# Patient Record
Sex: MALE | Race: WHITE | NOT HISPANIC OR LATINO | Employment: FULL TIME | URBAN - METROPOLITAN AREA
[De-identification: names, ages, dates, MRNs, and addresses within clinical notes are randomized per-mention and may not be internally consistent; named-entity substitution may affect disease eponyms.]

---

## 2023-03-02 LAB — PROSTATE SPECIFIC AG (NG/ML) IN SER/PLAS: 3.92 NG/ML (ref 0–4)

## 2023-12-13 ENCOUNTER — TELEPHONE (OUTPATIENT)
Dept: PRIMARY CARE | Facility: CLINIC | Age: 68
End: 2023-12-13
Payer: COMMERCIAL

## 2023-12-13 DIAGNOSIS — R35.0 INCREASED URINARY FREQUENCY: ICD-10-CM

## 2023-12-13 DIAGNOSIS — R35.1 NOCTURIA: ICD-10-CM

## 2023-12-13 RX ORDER — TAMSULOSIN HYDROCHLORIDE 0.4 MG/1
0.4 CAPSULE ORAL DAILY
COMMUNITY
End: 2023-12-13 | Stop reason: SDUPTHER

## 2023-12-13 NOTE — TELEPHONE ENCOUNTER
ALPHA SPLIT    Tamsulosin 0.4 mg    [facility] [address]    Last office visit: 1.30.23 Dr. Burris    Scheduled with Dr. Thompson 1.04.24

## 2023-12-15 RX ORDER — TAMSULOSIN HYDROCHLORIDE 0.4 MG/1
0.4 CAPSULE ORAL DAILY
Qty: 30 CAPSULE | Refills: 0 | Status: SHIPPED | OUTPATIENT
Start: 2023-12-15 | End: 2024-01-04 | Stop reason: SDUPTHER

## 2024-01-04 ENCOUNTER — LAB (OUTPATIENT)
Dept: LAB | Facility: LAB | Age: 69
End: 2024-01-04
Payer: COMMERCIAL

## 2024-01-04 ENCOUNTER — OFFICE VISIT (OUTPATIENT)
Dept: PRIMARY CARE | Facility: CLINIC | Age: 69
End: 2024-01-04
Payer: COMMERCIAL

## 2024-01-04 VITALS
BODY MASS INDEX: 29.96 KG/M2 | WEIGHT: 214 LBS | HEART RATE: 49 BPM | OXYGEN SATURATION: 98 % | TEMPERATURE: 97.3 F | DIASTOLIC BLOOD PRESSURE: 84 MMHG | SYSTOLIC BLOOD PRESSURE: 140 MMHG | HEIGHT: 71 IN

## 2024-01-04 DIAGNOSIS — R03.0 ELEVATED BLOOD PRESSURE READING: ICD-10-CM

## 2024-01-04 DIAGNOSIS — R35.1 BENIGN PROSTATIC HYPERPLASIA WITH NOCTURIA: ICD-10-CM

## 2024-01-04 DIAGNOSIS — R35.0 INCREASED URINARY FREQUENCY: ICD-10-CM

## 2024-01-04 DIAGNOSIS — N40.1 BENIGN PROSTATIC HYPERPLASIA WITH NOCTURIA: ICD-10-CM

## 2024-01-04 DIAGNOSIS — Z00.00 WELLNESS EXAMINATION: Primary | ICD-10-CM

## 2024-01-04 DIAGNOSIS — R35.1 NOCTURIA: ICD-10-CM

## 2024-01-04 DIAGNOSIS — Z12.11 SCREEN FOR COLON CANCER: ICD-10-CM

## 2024-01-04 DIAGNOSIS — E78.1 HYPERTRIGLYCERIDEMIA: ICD-10-CM

## 2024-01-04 LAB
ALBUMIN SERPL BCP-MCNC: 4.3 G/DL (ref 3.4–5)
ALP SERPL-CCNC: 69 U/L (ref 33–136)
ALT SERPL W P-5'-P-CCNC: 22 U/L (ref 10–52)
ANION GAP SERPL CALC-SCNC: 11 MMOL/L (ref 10–20)
AST SERPL W P-5'-P-CCNC: 20 U/L (ref 9–39)
BILIRUB SERPL-MCNC: 0.9 MG/DL (ref 0–1.2)
BUN SERPL-MCNC: 20 MG/DL (ref 6–23)
CALCIUM SERPL-MCNC: 9.4 MG/DL (ref 8.6–10.3)
CHLORIDE SERPL-SCNC: 103 MMOL/L (ref 98–107)
CHOLEST SERPL-MCNC: 180 MG/DL (ref 0–199)
CHOLESTEROL/HDL RATIO: 3.9
CO2 SERPL-SCNC: 31 MMOL/L (ref 21–32)
CREAT SERPL-MCNC: 0.98 MG/DL (ref 0.5–1.3)
ERYTHROCYTE [DISTWIDTH] IN BLOOD BY AUTOMATED COUNT: 13.3 % (ref 11.5–14.5)
EST. AVERAGE GLUCOSE BLD GHB EST-MCNC: 114 MG/DL
GFR SERPL CREATININE-BSD FRML MDRD: 85 ML/MIN/1.73M*2
GLUCOSE SERPL-MCNC: 94 MG/DL (ref 74–99)
HBA1C MFR BLD: 5.6 %
HCT VFR BLD AUTO: 48.1 % (ref 41–52)
HDLC SERPL-MCNC: 46.4 MG/DL
HGB BLD-MCNC: 16.4 G/DL (ref 13.5–17.5)
LDLC SERPL CALC-MCNC: 81 MG/DL
MCH RBC QN AUTO: 29.4 PG (ref 26–34)
MCHC RBC AUTO-ENTMCNC: 34.1 G/DL (ref 32–36)
MCV RBC AUTO: 86 FL (ref 80–100)
NON HDL CHOLESTEROL: 134 MG/DL (ref 0–149)
NRBC BLD-RTO: 0 /100 WBCS (ref 0–0)
PLATELET # BLD AUTO: 254 X10*3/UL (ref 150–450)
POTASSIUM SERPL-SCNC: 4.5 MMOL/L (ref 3.5–5.3)
PROT SERPL-MCNC: 6.8 G/DL (ref 6.4–8.2)
RBC # BLD AUTO: 5.57 X10*6/UL (ref 4.5–5.9)
SODIUM SERPL-SCNC: 140 MMOL/L (ref 136–145)
TRIGL SERPL-MCNC: 263 MG/DL (ref 0–149)
VLDL: 53 MG/DL (ref 0–40)
WBC # BLD AUTO: 8.1 X10*3/UL (ref 4.4–11.3)

## 2024-01-04 PROCEDURE — 80053 COMPREHEN METABOLIC PANEL: CPT

## 2024-01-04 PROCEDURE — 36415 COLL VENOUS BLD VENIPUNCTURE: CPT

## 2024-01-04 PROCEDURE — 99397 PER PM REEVAL EST PAT 65+ YR: CPT | Performed by: FAMILY MEDICINE

## 2024-01-04 PROCEDURE — 80061 LIPID PANEL: CPT

## 2024-01-04 PROCEDURE — 85027 COMPLETE CBC AUTOMATED: CPT

## 2024-01-04 PROCEDURE — 82274 ASSAY TEST FOR BLOOD FECAL: CPT

## 2024-01-04 PROCEDURE — 1160F RVW MEDS BY RX/DR IN RCRD: CPT | Performed by: FAMILY MEDICINE

## 2024-01-04 PROCEDURE — 83036 HEMOGLOBIN GLYCOSYLATED A1C: CPT

## 2024-01-04 PROCEDURE — 1159F MED LIST DOCD IN RCRD: CPT | Performed by: FAMILY MEDICINE

## 2024-01-04 PROCEDURE — 1036F TOBACCO NON-USER: CPT | Performed by: FAMILY MEDICINE

## 2024-01-04 RX ORDER — TAMSULOSIN HYDROCHLORIDE 0.4 MG/1
0.4 CAPSULE ORAL DAILY
Qty: 90 CAPSULE | Refills: 1 | Status: SHIPPED | OUTPATIENT
Start: 2024-01-04 | End: 2024-07-02

## 2024-01-04 ASSESSMENT — PATIENT HEALTH QUESTIONNAIRE - PHQ9
1. LITTLE INTEREST OR PLEASURE IN DOING THINGS: NOT AT ALL
SUM OF ALL RESPONSES TO PHQ9 QUESTIONS 1 AND 2: 0
2. FEELING DOWN, DEPRESSED OR HOPELESS: NOT AT ALL

## 2024-01-04 ASSESSMENT — ENCOUNTER SYMPTOMS
LOSS OF SENSATION IN FEET: 0
DEPRESSION: 0
OCCASIONAL FEELINGS OF UNSTEADINESS: 0

## 2024-01-04 NOTE — ASSESSMENT & PLAN NOTE
Had an elevated PSA   - Follows w/ Urology   - Tamsulosin daily   - postural orthostatic hypotension since taking tamsulosin; toelrable at this time.

## 2024-01-04 NOTE — PROGRESS NOTES
"Subjective   Patient ID: Abhijeet Broderick is a 68 y.o. male who presents for Annual Exam.    Daughters 24, 34 ([facility])   Currently remodeling his basement.     Exercise: running/biking, lifting weights   Diet: home cooked meals, no red meat   FMHx: up todated     BPH w/ Nocturia   - follows w/ urology   - dizziness; with standing up quickly; blurry vision due to vision   - PSA in June scheduled     Elevated Triglycerides   - 223 at last blood work (9/23)   -    The 10-year ASCVD risk score (Hank HERNÁNDEZ, et al., 2019) is: 14.4%    Values used to calculate the score:      Age: 68 years      Sex: Male      Is Non- : No      Diabetic: No      Tobacco smoker: No      Systolic Blood Pressure: 140 mmHg      Is BP treated: No      HDL Cholesterol: 51.6 mg/dL      Total Cholesterol: 176 mg/dL     Objective   /84 (BP Location: Left arm, Patient Position: Sitting, BP Cuff Size: Adult)   Pulse (!) 49   Temp 36.3 °C (97.3 °F) (Skin)   Ht 1.803 m (5' 11\")   Wt 97.1 kg (214 lb)   SpO2 98%   BMI 29.85 kg/m²     Physical Exam  Constitutional:       General: He is not in acute distress.     Appearance: Normal appearance. He is not ill-appearing, toxic-appearing or diaphoretic.   HENT:      Head: Normocephalic and atraumatic.   Eyes:      Extraocular Movements: Extraocular movements intact.      Pupils: Pupils are equal, round, and reactive to light.   Cardiovascular:      Rate and Rhythm: Normal rate and regular rhythm.      Pulses: Normal pulses.      Heart sounds: Normal heart sounds.   Pulmonary:      Effort: Pulmonary effort is normal.      Breath sounds: Normal breath sounds.   Neurological:      Mental Status: He is alert.         Assessment/Plan   Problem List Items Addressed This Visit             ICD-10-CM       Cardiac and Vasculature    Hypertriglyceridemia E78.1     -    - ASCVD Risk 14.4%   - not currently on any medication; discussed risks and benefits of statin medication; he " would like to repeat lipid panel and see where values are at this time before initiating medication.             Genitourinary and Reproductive    Benign prostatic hyperplasia with nocturia N40.1, R35.1     Had an elevated PSA   - Follows w/ Urology   - Tamsulosin daily   - postural orthostatic hypotension since taking tamsulosin; toelrable at this time.           Other Visit Diagnoses         Codes    Wellness examination    -  Primary Z00.00    FIT testing annually, reordered.     Elevated blood pressure reading     R03.0    Relevant Orders    Lipid panel    Hemoglobin A1c    Increased urinary frequency     R35.0    Relevant Medications    tamsulosin (Flomax) 0.4 mg 24 hr capsule    Other Relevant Orders    Comprehensive metabolic panel    CBC    Nocturia     R35.1    Relevant Medications    tamsulosin (Flomax) 0.4 mg 24 hr capsule    Other Relevant Orders    Comprehensive metabolic panel    CBC    Screen for colon cancer     Z12.11    Relevant Orders    Fecal Occult Blood Immunoassy          Komal Thompson DO

## 2024-01-04 NOTE — ASSESSMENT & PLAN NOTE
-    - ASCVD Risk 14.4%   - not currently on any medication; discussed risks and benefits of statin medication; he would like to repeat lipid panel and see where values are at this time before initiating medication.

## 2024-01-15 ENCOUNTER — LAB REQUISITION (OUTPATIENT)
Dept: LAB | Facility: HOSPITAL | Age: 69
End: 2024-01-15
Payer: COMMERCIAL

## 2024-01-15 DIAGNOSIS — Z12.11 ENCOUNTER FOR SCREENING FOR MALIGNANT NEOPLASM OF COLON: ICD-10-CM

## 2024-01-18 LAB — HEMOCCULT STL QL IA: NEGATIVE

## 2024-04-10 ENCOUNTER — OFFICE VISIT (OUTPATIENT)
Dept: PRIMARY CARE | Facility: CLINIC | Age: 69
End: 2024-04-10
Payer: COMMERCIAL

## 2024-04-10 VITALS
DIASTOLIC BLOOD PRESSURE: 100 MMHG | OXYGEN SATURATION: 99 % | HEART RATE: 53 BPM | BODY MASS INDEX: 29.79 KG/M2 | SYSTOLIC BLOOD PRESSURE: 170 MMHG | WEIGHT: 213.6 LBS | TEMPERATURE: 97.6 F

## 2024-04-10 DIAGNOSIS — I10 PRIMARY HYPERTENSION: ICD-10-CM
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
## 2024-04-10 DIAGNOSIS — M62.838 MUSCLE SPASM: ICD-10-CM

## 2024-04-10 DIAGNOSIS — M54.50 LOW BACK PAIN, UNSPECIFIED BACK PAIN LATERALITY, UNSPECIFIED CHRONICITY, UNSPECIFIED WHETHER SCIATICA PRESENT: ICD-10-CM

## 2024-04-10 PROCEDURE — 3080F DIAST BP >= 90 MM HG: CPT | Performed by: FAMILY MEDICINE

## 2024-04-10 PROCEDURE — 1160F RVW MEDS BY RX/DR IN RCRD: CPT | Performed by: FAMILY MEDICINE

## 2024-04-10 PROCEDURE — 99213 OFFICE O/P EST LOW 20 MIN: CPT | Performed by: FAMILY MEDICINE

## 2024-04-10 PROCEDURE — 1159F MED LIST DOCD IN RCRD: CPT | Performed by: FAMILY MEDICINE

## 2024-04-10 PROCEDURE — 3077F SYST BP >= 140 MM HG: CPT | Performed by: FAMILY MEDICINE

## 2024-04-10 ASSESSMENT — ENCOUNTER SYMPTOMS
DEPRESSION: 0
LOSS OF SENSATION IN FEET: 0
OCCASIONAL FEELINGS OF UNSTEADINESS: 0

## 2024-04-10 NOTE — PROGRESS NOTES
Subjective   Patient ID: Abhijeet Broderick is a 69 y.o. male who presents for Fall (4 days ago, fell down 16 stairs.  Back and shoulder pain.).     Abhijeet is presenting to the office after a fall down his stairs.  He was looking to fix they are chairlift for stairs when he fell down on the stairs he hit his shoulder and his back and his head on the stairs.  This occurred 4 days ago.  Since that time he has not lost consciousness he has had some headaches and has been taking Tylenol and ibuprofen for the pain he did have a cut on his head but is no longer bleeding.  He states that he was and is mostly sore at this time but ultimately things are getting better day by day small approximately 5 mm        Advil   - every 4-6 hours   - 800mg       Objective   BP (!) 170/100 (BP Location: Left arm, Patient Position: Sitting, BP Cuff Size: Adult)   Pulse 53   Temp 36.4 °C (97.6 °F) (Skin)   Wt 96.9 kg (213 lb 9.6 oz)   SpO2 99%   BMI 29.79 kg/m²     Physical Exam  Musculoskeletal:      Right shoulder: No tenderness.      Left shoulder: Tenderness present. Normal range of motion.      Cervical back: Normal.      Thoracic back: Normal.      Lumbar back: Tenderness and bony tenderness present.   Skin:            Comments: Laceration to the back of the head, Small approximately 5 mm, healing         Assessment/Plan   Diagnoses and all orders for this visit:  Fall, initial encounter  Low back pain, unspecified back pain laterality, unspecified chronicity, unspecified whether sciatica present  Muscle spasm  Primary hypertension  Abhijeet is a 69-year-old male presenting to the office after a fall.  He has a laceration to the back of the head that is well-healing healing and does not need any other intervention at this time.  In regards to his shoulder remote low back pain I do believe this is all musculoskeletal.  We did discuss the pros and cons of getting an x-ray imaging.  However we can forego that at this time.  His  blood pressure was significantly elevated in the office to 170/100 but I suspect that this is in part due to his increased use of ibuprofen over the past few days since the fall.  We discussed that once he comes off the medication we should recheck his blood pressure to determine if treatment is necessary.  We discussed checking his blood pressure at home to ensure that it does come down.      Komal Thompson,      I spent a total of 14 minutes on the date of the service which included preparing to see the patient, face-to-face patient care, completing clinical documentation, performing a medically appropriate examination, counseling and educating the patient/family/caregiver and ordering medications, tests, or procedures.

## 2024-05-20 ENCOUNTER — LAB (OUTPATIENT)
Dept: LAB | Facility: LAB | Age: 69
End: 2024-05-20
Payer: COMMERCIAL

## 2024-05-20 DIAGNOSIS — R97.20 ELEVATED PROSTATE SPECIFIC ANTIGEN (PSA): Primary | ICD-10-CM

## 2024-05-20 LAB — PSA SERPL-MCNC: 3.32 NG/ML

## 2024-05-20 PROCEDURE — 84153 ASSAY OF PSA TOTAL: CPT

## 2024-05-20 PROCEDURE — 36415 COLL VENOUS BLD VENIPUNCTURE: CPT

## 2024-07-03 DIAGNOSIS — R35.0 INCREASED URINARY FREQUENCY: ICD-10-CM

## 2024-07-03 DIAGNOSIS — R35.1 NOCTURIA: ICD-10-CM

## 2024-07-05 RX ORDER — TAMSULOSIN HYDROCHLORIDE 0.4 MG/1
0.4 CAPSULE ORAL DAILY
Qty: 90 CAPSULE | Refills: 0 | Status: SHIPPED | OUTPATIENT
Start: 2024-07-05

## 2024-09-27 ENCOUNTER — TELEPHONE (OUTPATIENT)
Dept: PRIMARY CARE | Facility: CLINIC | Age: 69
End: 2024-09-27
Payer: COMMERCIAL

## 2024-09-27 DIAGNOSIS — R35.1 NOCTURIA: ICD-10-CM

## 2024-09-27 DIAGNOSIS — R35.0 INCREASED URINARY FREQUENCY: ICD-10-CM

## 2024-09-27 NOTE — TELEPHONE ENCOUNTER
Rx Refill Request Telephone Encounter  tamsulosin (Flomax) 0.4 mg 24 hr capsule      Pharmacy:   [facility]

## 2024-09-30 RX ORDER — TAMSULOSIN HYDROCHLORIDE 0.4 MG/1
0.4 CAPSULE ORAL DAILY
Qty: 90 CAPSULE | Refills: 1 | Status: SHIPPED | OUTPATIENT
Start: 2024-09-30

## 2025-01-29 ENCOUNTER — APPOINTMENT (OUTPATIENT)
Dept: CARDIOLOGY | Facility: HOSPITAL | Age: 70
End: 2025-01-29
Payer: COMMERCIAL

## 2025-01-29 ENCOUNTER — HOSPITAL ENCOUNTER (INPATIENT)
Facility: HOSPITAL | Age: 70
LOS: 2 days | Discharge: HOME | End: 2025-01-31
Attending: EMERGENCY MEDICINE | Admitting: INTERNAL MEDICINE
Payer: COMMERCIAL

## 2025-01-29 ENCOUNTER — APPOINTMENT (OUTPATIENT)
Dept: RADIOLOGY | Facility: HOSPITAL | Age: 70
End: 2025-01-29
Payer: COMMERCIAL

## 2025-01-29 ENCOUNTER — APPOINTMENT (OUTPATIENT)
Dept: PRIMARY CARE | Facility: CLINIC | Age: 70
End: 2025-01-29
Payer: COMMERCIAL

## 2025-01-29 VITALS
BODY MASS INDEX: 30.6 KG/M2 | HEIGHT: 71 IN | WEIGHT: 218.6 LBS | DIASTOLIC BLOOD PRESSURE: 90 MMHG | SYSTOLIC BLOOD PRESSURE: 190 MMHG | TEMPERATURE: 97.3 F | OXYGEN SATURATION: 97 % | HEART RATE: 46 BPM

## 2025-01-29 DIAGNOSIS — I49.9 CARDIAC ARRHYTHMIA, UNSPECIFIED CARDIAC ARRHYTHMIA TYPE: ICD-10-CM

## 2025-01-29 DIAGNOSIS — I15.9 SECONDARY HYPERTENSION: ICD-10-CM

## 2025-01-29 DIAGNOSIS — R00.1 BRADYCARDIA: Primary | ICD-10-CM

## 2025-01-29 DIAGNOSIS — I16.1 HYPERTENSIVE EMERGENCY: ICD-10-CM

## 2025-01-29 DIAGNOSIS — R06.02 SOB (SHORTNESS OF BREATH): ICD-10-CM

## 2025-01-29 DIAGNOSIS — R00.1 SYMPTOMATIC BRADYCARDIA: ICD-10-CM

## 2025-01-29 DIAGNOSIS — R00.1 BRADYCARDIA, UNSPECIFIED: ICD-10-CM

## 2025-01-29 DIAGNOSIS — Z95.0 STATUS POST PLACEMENT OF CARDIAC PACEMAKER: ICD-10-CM

## 2025-01-29 DIAGNOSIS — I44.1 HEART BLOCK AV SECOND DEGREE: ICD-10-CM

## 2025-01-29 DIAGNOSIS — I45.9 HEART BLOCK: Primary | ICD-10-CM

## 2025-01-29 LAB
ALBUMIN SERPL BCP-MCNC: 4.4 G/DL (ref 3.4–5)
ALP SERPL-CCNC: 74 U/L (ref 33–136)
ALT SERPL W P-5'-P-CCNC: 16 U/L (ref 10–52)
ANION GAP SERPL CALC-SCNC: 8 MMOL/L (ref 10–20)
AORTIC VALVE MEAN GRADIENT: 5 MMHG
AORTIC VALVE PEAK VELOCITY: 1.67 M/S
APTT PPP: 27 SECONDS (ref 27–38)
AST SERPL W P-5'-P-CCNC: 16 U/L (ref 9–39)
ATRIAL RATE: 28 BPM
AV PEAK GRADIENT: 11 MMHG
AVA (PEAK VEL): 2.32 CM2
AVA (VTI): 2.19 CM2
BASOPHILS # BLD AUTO: 0.08 X10*3/UL (ref 0–0.1)
BASOPHILS NFR BLD AUTO: 1 %
BILIRUB SERPL-MCNC: 1 MG/DL (ref 0–1.2)
BNP SERPL-MCNC: 318 PG/ML (ref 0–99)
BUN SERPL-MCNC: 19 MG/DL (ref 6–23)
CALCIUM SERPL-MCNC: 9.1 MG/DL (ref 8.6–10.3)
CARDIAC TROPONIN I PNL SERPL HS: 12 NG/L (ref 0–20)
CARDIAC TROPONIN I PNL SERPL HS: 13 NG/L (ref 0–20)
CHLORIDE SERPL-SCNC: 105 MMOL/L (ref 98–107)
CO2 SERPL-SCNC: 31 MMOL/L (ref 21–32)
CREAT SERPL-MCNC: 0.97 MG/DL (ref 0.5–1.3)
EGFRCR SERPLBLD CKD-EPI 2021: 86 ML/MIN/1.73M*2
EJECTION FRACTION APICAL 4 CHAMBER: 63.7
EJECTION FRACTION: 66 %
EOSINOPHIL # BLD AUTO: 0.39 X10*3/UL (ref 0–0.7)
EOSINOPHIL NFR BLD AUTO: 5.1 %
ERYTHROCYTE [DISTWIDTH] IN BLOOD BY AUTOMATED COUNT: 13.2 % (ref 11.5–14.5)
GLUCOSE SERPL-MCNC: 100 MG/DL (ref 74–99)
HCT VFR BLD AUTO: 44.2 % (ref 41–52)
HGB BLD-MCNC: 14.8 G/DL (ref 13.5–17.5)
IMM GRANULOCYTES # BLD AUTO: 0.04 X10*3/UL (ref 0–0.7)
IMM GRANULOCYTES NFR BLD AUTO: 0.5 % (ref 0–0.9)
INR PPP: 1.1 (ref 0.9–1.1)
LEFT ATRIUM VOLUME AREA LENGTH INDEX BSA: 40.1 ML/M2
LEFT VENTRICLE INTERNAL DIMENSION DIASTOLE: 5.28 CM (ref 3.5–6)
LEFT VENTRICULAR OUTFLOW TRACT DIAMETER: 2.03 CM
LYMPHOCYTES # BLD AUTO: 1.66 X10*3/UL (ref 1.2–4.8)
LYMPHOCYTES NFR BLD AUTO: 21.7 %
MAGNESIUM SERPL-MCNC: 2.13 MG/DL (ref 1.6–2.4)
MCH RBC QN AUTO: 28.8 PG (ref 26–34)
MCHC RBC AUTO-ENTMCNC: 33.5 G/DL (ref 32–36)
MCV RBC AUTO: 86 FL (ref 80–100)
MITRAL VALVE E/A RATIO: 0.96
MONOCYTES # BLD AUTO: 0.83 X10*3/UL (ref 0.1–1)
MONOCYTES NFR BLD AUTO: 10.8 %
NEUTROPHILS # BLD AUTO: 4.66 X10*3/UL (ref 1.2–7.7)
NEUTROPHILS NFR BLD AUTO: 60.9 %
NRBC BLD-RTO: 0 /100 WBCS (ref 0–0)
P AXIS: 28 DEGREES
P OFFSET: 181 MS
P ONSET: 126 MS
PLATELET # BLD AUTO: 210 X10*3/UL (ref 150–450)
POTASSIUM SERPL-SCNC: 4.8 MMOL/L (ref 3.5–5.3)
PR INTERVAL: 186 MS
PROT SERPL-MCNC: 6.7 G/DL (ref 6.4–8.2)
PROTHROMBIN TIME: 11.9 SECONDS (ref 9.8–12.8)
Q ONSET: 219 MS
QRS COUNT: 5 BEATS
QRS DURATION: 80 MS
QT INTERVAL: 468 MS
QTC CALCULATION(BAZETT): 319 MS
QTC FREDERICIA: 363 MS
R AXIS: -16 DEGREES
RBC # BLD AUTO: 5.13 X10*6/UL (ref 4.5–5.9)
RIGHT VENTRICLE FREE WALL PEAK S': 17 CM/S
RIGHT VENTRICLE PEAK SYSTOLIC PRESSURE: 31.5 MMHG
SODIUM SERPL-SCNC: 139 MMOL/L (ref 136–145)
T AXIS: 4 DEGREES
T OFFSET: 453 MS
TRICUSPID ANNULAR PLANE SYSTOLIC EXCURSION: 2.7 CM
TSH SERPL-ACNC: 2.35 MIU/L (ref 0.44–3.98)
VENTRICULAR RATE: 28 BPM
WBC # BLD AUTO: 7.7 X10*3/UL (ref 4.4–11.3)

## 2025-01-29 PROCEDURE — 3077F SYST BP >= 140 MM HG: CPT | Performed by: FAMILY MEDICINE

## 2025-01-29 PROCEDURE — 80053 COMPREHEN METABOLIC PANEL: CPT | Performed by: EMERGENCY MEDICINE

## 2025-01-29 PROCEDURE — 36415 COLL VENOUS BLD VENIPUNCTURE: CPT | Performed by: EMERGENCY MEDICINE

## 2025-01-29 PROCEDURE — 84443 ASSAY THYROID STIM HORMONE: CPT | Performed by: EMERGENCY MEDICINE

## 2025-01-29 PROCEDURE — 84484 ASSAY OF TROPONIN QUANT: CPT | Performed by: EMERGENCY MEDICINE

## 2025-01-29 PROCEDURE — 71045 X-RAY EXAM CHEST 1 VIEW: CPT | Mod: FOREIGN READ | Performed by: RADIOLOGY

## 2025-01-29 PROCEDURE — 93306 TTE W/DOPPLER COMPLETE: CPT | Performed by: STUDENT IN AN ORGANIZED HEALTH CARE EDUCATION/TRAINING PROGRAM

## 2025-01-29 PROCEDURE — 2060000001 HC INTERMEDIATE ICU ROOM DAILY

## 2025-01-29 PROCEDURE — 93000 ELECTROCARDIOGRAM COMPLETE: CPT | Performed by: FAMILY MEDICINE

## 2025-01-29 PROCEDURE — 85730 THROMBOPLASTIN TIME PARTIAL: CPT | Performed by: EMERGENCY MEDICINE

## 2025-01-29 PROCEDURE — 99223 1ST HOSP IP/OBS HIGH 75: CPT | Performed by: STUDENT IN AN ORGANIZED HEALTH CARE EDUCATION/TRAINING PROGRAM

## 2025-01-29 PROCEDURE — 99285 EMERGENCY DEPT VISIT HI MDM: CPT | Mod: 25 | Performed by: EMERGENCY MEDICINE

## 2025-01-29 PROCEDURE — 85025 COMPLETE CBC W/AUTO DIFF WBC: CPT | Performed by: EMERGENCY MEDICINE

## 2025-01-29 PROCEDURE — 83735 ASSAY OF MAGNESIUM: CPT | Performed by: EMERGENCY MEDICINE

## 2025-01-29 PROCEDURE — 99221 1ST HOSP IP/OBS SF/LOW 40: CPT | Performed by: INTERNAL MEDICINE

## 2025-01-29 PROCEDURE — 71045 X-RAY EXAM CHEST 1 VIEW: CPT

## 2025-01-29 PROCEDURE — 85610 PROTHROMBIN TIME: CPT | Performed by: EMERGENCY MEDICINE

## 2025-01-29 PROCEDURE — 1159F MED LIST DOCD IN RCRD: CPT | Performed by: FAMILY MEDICINE

## 2025-01-29 PROCEDURE — 3080F DIAST BP >= 90 MM HG: CPT | Performed by: FAMILY MEDICINE

## 2025-01-29 PROCEDURE — 83880 ASSAY OF NATRIURETIC PEPTIDE: CPT | Performed by: EMERGENCY MEDICINE

## 2025-01-29 PROCEDURE — 1124F ACP DISCUSS-NO DSCNMKR DOCD: CPT | Performed by: FAMILY MEDICINE

## 2025-01-29 PROCEDURE — 93306 TTE W/DOPPLER COMPLETE: CPT

## 2025-01-29 PROCEDURE — 99215 OFFICE O/P EST HI 40 MIN: CPT | Performed by: FAMILY MEDICINE

## 2025-01-29 PROCEDURE — 3008F BODY MASS INDEX DOCD: CPT | Performed by: FAMILY MEDICINE

## 2025-01-29 PROCEDURE — 93005 ELECTROCARDIOGRAM TRACING: CPT

## 2025-01-29 RX ORDER — HALOBETASOL PROPIONATE 0.5 MG/G
CREAM TOPICAL
COMMUNITY
Start: 2025-01-23

## 2025-01-29 ASSESSMENT — PATIENT HEALTH QUESTIONNAIRE - PHQ9
2. FEELING DOWN, DEPRESSED OR HOPELESS: NOT AT ALL
1. LITTLE INTEREST OR PLEASURE IN DOING THINGS: NOT AT ALL
SUM OF ALL RESPONSES TO PHQ9 QUESTIONS 1 AND 2: 0

## 2025-01-29 ASSESSMENT — ENCOUNTER SYMPTOMS
OCCASIONAL FEELINGS OF UNSTEADINESS: 0
LOSS OF SENSATION IN FEET: 0
DEPRESSION: 0

## 2025-01-29 ASSESSMENT — COLUMBIA-SUICIDE SEVERITY RATING SCALE - C-SSRS
6. HAVE YOU EVER DONE ANYTHING, STARTED TO DO ANYTHING, OR PREPARED TO DO ANYTHING TO END YOUR LIFE?: NO
2. HAVE YOU ACTUALLY HAD ANY THOUGHTS OF KILLING YOURSELF?: NO
1. IN THE PAST MONTH, HAVE YOU WISHED YOU WERE DEAD OR WISHED YOU COULD GO TO SLEEP AND NOT WAKE UP?: NO

## 2025-01-29 ASSESSMENT — PAIN - FUNCTIONAL ASSESSMENT: PAIN_FUNCTIONAL_ASSESSMENT: 0-10

## 2025-01-29 ASSESSMENT — PAIN SCALES - GENERAL: PAINLEVEL_OUTOF10: 0 - NO PAIN

## 2025-01-29 NOTE — CONSULTS
"Inpatient consult to Cardiology  Consult performed by: Bob COOPER MD  Consult ordered by: Anders Doll MD  Reason for consult: bradycardia        History Of Present Illness:    Abhijeet Broderick is a 69 y.o. male with no significant history presents from his primary care's office with bradycardia.  Cardiology is now consulted for \"bradycardia\"    Patient claims over the last couple weeks he has noticed his heart rate has been \"on the lower side\".  He says at times he is lightheaded and dizzy when he goes upstairs; he works out on the elliptical machine and is noticed that he is more short of breath-> after exercising for a while eventually his heart rate comes up and he feels better.  He says after his exercise it tends to go back down.  He denies any kind of chest pain, nausea, vomiting he does endorse lightheadedness-> follow-up with his primary care physician today and was sent to the ER after reviewing EKG and symptoms.  He denies any alcohol or illicit drugs.  Currently not on any medications at home besides tamsulosin 0.4 mg daily    Afebrile, heart rate 38-52, blood pressure on admit was 215/95, 97% on room air.  Notable labs on admission BUNs/CR 19/0.97, , potassium 4.8, high sensitive troponin 12, TSH 2.35, INR 1.1, WBC 7.7, H&H 14.8/44.2, chest x-ray is non acute.     Past Cardiology Tests (Last 3 Years):  Admit EKG 1/29/25        Past Medical History:  He has a past medical history of Other injury of unspecified body region, initial encounter (11/23/2020), Personal history of diseases of the blood and blood-forming organs and certain disorders involving the immune mechanism (01/10/2017), Personal history of diseases of the skin and subcutaneous tissue (01/06/2017), Personal history of diseases of the skin and subcutaneous tissue (02/18/2019), and Unspecified acute conjunctivitis, bilateral (02/18/2019).    Past Surgical History:  He has a past surgical history that includes Hardy " "tooth extraction; Wrist fracture surgery (Right); and Back surgery.      Social History:  He reports that he has never smoked. He has never been exposed to tobacco smoke. He has never used smokeless tobacco. He reports that he does not drink alcohol and does not use drugs.    Family History:  Family History   Problem Relation Name Age of Onset    Diabetes Mother  86    Diabetes Father      Hyperlipidemia Sister  69    Diabetes Sister          Allergies:  Patient has no known allergies.    ROS:  10 point review of systems including (Constitutional, Eyes, ENMT, Respiratory, Cardiac, Gastrointestinal, Neurological, Psychiatric, and Hematologic) was performed and is otherwise negative.    Objective Data:  Last Recorded Vitals:  Vitals:    25 1045 25 1100 25 1115 25 1130   BP: 150/86 151/82 154/74    BP Location:       Patient Position:       Pulse: (!) 29 (!) 28 (!) 35 (!) 28   Resp: 19 20 (!) 21 (!) 24   Temp:       TempSrc:       SpO2: 96% 97% 95% 94%   Weight:       Height:         Medical Gas Therapy: None (Room air)  Weight  Av kg (218 lb 4.8 oz)  Min: 98.9 kg (218 lb)  Max: 99.2 kg (218 lb 9.6 oz)      LABS:  CMP:  Results from last 7 days   Lab Units 25  1026   SODIUM mmol/L 139   POTASSIUM mmol/L 4.8   CHLORIDE mmol/L 105   CO2 mmol/L 31   ANION GAP mmol/L 8*   BUN mg/dL 19   CREATININE mg/dL 0.97   EGFR mL/min/1.73m*2 86   MAGNESIUM mg/dL 2.13   ALBUMIN g/dL 4.4   ALT U/L 16   AST U/L 16   BILIRUBIN TOTAL mg/dL 1.0     CBC:  Results from last 7 days   Lab Units 25  1026   WBC AUTO x10*3/uL 7.7   HEMOGLOBIN g/dL 14.8   HEMATOCRIT % 44.2   PLATELETS AUTO x10*3/uL 210   MCV fL 86     COAG:   Results from last 7 days   Lab Units 25  1026   INR  1.1     ABO: No results found for: \"ABO\"  HEME/ENDO:  Results from last 7 days   Lab Units 25  1026   TSH mIU/L 2.35      CARDIAC:   Results from last 7 days   Lab Units 25  1026   TROPHS ng/L 12   BNP pg/mL 318*    " "         Last I/O:  No intake or output data in the 24 hours ending 01/29/25 1137  Net IO Since Admission: No IO data has been entered for this period [01/29/25 1137]      Imaging Results:  No results found.    Inpatient Medications:          Outpatient Medications:  Current Outpatient Medications   Medication Instructions    tamsulosin (FLOMAX) 0.4 mg, oral, Daily       Physical Exam:  General:  Patient is awake, alert, and oriented.  Patient is in no acute distress.  HEENT:  Pupils equal and reactive.  Normocephalic.  Moist mucosa.    Neck:  No thyromegaly.  Normal Jugular Venous Pressure.  Cardiovascular:  Regular rate and rhythm.  Normal S1 and S2.  Pulmonary:  Clear to auscultation bilaterally.  Abdomen:  Soft. Non-tender.   Non-distended.  Positive bowel sounds.  Lower Extremities:  2+ pedal pulses. No LE edema.  Neurologic:  Cranial nerves intact.  No focal deficit.   Skin: Skin warm and dry, normal skin turgor.   Psychiatric: Normal affect.     Assessment/Plan   Abhijeet Broderick is a 69 y.o. male with no significant history presents from his primary care's office with bradycardia.  Cardiology is now consulted for \"bradycardia\"      TTE 1/29/25  CONCLUSIONS:   1. Left ventricular ejection fraction is normal, calculated by Barajas's biplane at 66%.   2. The left atrial size is mild to moderately dilated.   3. The inferior vena cava appears mildly dilated, with IVC inspiratory collapse greater than 50%.   4. The patient was bradycardic with heart rates in the 30s throughout most of the exam.    #Sinus node dysfunction with symptomatic bradycardia  -HR 28-35, symptoms of dizziness and fatigue   #HTN urgency on arrival-190s- resolving       RECS:  -Case discussed with Dr Garcia of EP; NPO at midnight for dual chamber PPM tomorrow   -if more BP control needed can add on Losartan 25mg daily   -c/w telemetry   -avoid all AV michael blockers  - k >4, Mag>2, replace as needed       MARTIN Hill-CNP       Thank " you for allowing me to participate in their care.  Please feel free to call me with any further questions or concerns.    Bob Ribeiro MD, FACC, [facility]  Division of Cardiovascular Medicine  System Director, Nuclear Cardiology   Medical Director, [facility] [place], [facility]   Clinical , [employer]  [email or web address]   Office:  [phone]          Both the YVES and I have had a face to face encounter with the patient today. I have examined the patient and edited the documented physical examination as necessary.  I personally reviewed the patient's vital signs, telemetry, recent labs, medications, orders, EKGs, and pertinent cardiac imaging/ echocardiography.  I have reviewed the YVES's encounter note, approve the YVES's documentation and have edited the note to reflect my diagnostic and therapeutic plan.

## 2025-01-29 NOTE — ED TRIAGE NOTES
Pt c/o dizziness and SOB. Denies CP. Pt states he has had the dizziness for a while. Pt was sent in by PCP.

## 2025-01-29 NOTE — PROGRESS NOTES
Assessment/Plan     This is a 69 y.o. male presenting due to home heart rates in 30's with symptoms of lightheaded/dizziness intermittently; In office 2 EKG's complete with variable findigns   EKG 1: IL intervals fluctuating from 200-800ms w/ no specific patterns; Qtc 568 HR 50   EKG 2: IL consistently 200ms w/ HR 31 w/ variable intervals between each beat  It seems as though this is a second degree Heart Block; no discordant P-Q intervals.     Additionally, BP is 190/90; no symptoms of headache, shortness of breath or chest pain at this time, but in light of EKG findings / changes qualifies as hypertensive emergency.     Discussed with the patient the concern for him to go into complete heart block or with the elevated Qtc it could go into fatal arrhythmia; I recommended being transferred via EMS to ER but pt declined. I discussed with him that he cannot drive due to the potential for complete heart block or fatal arrhythmias. He is going to have his wife and daughter transport him to the ER.     I called to discuss case w/ Dr. Doll at [facility] ED.       Problem List Items Addressed This Visit    None  Visit Diagnoses       Bradycardia    -  Primary    Relevant Orders    ECG 12 lead (Clinic Performed)    Cardiac arrhythmia, unspecified cardiac arrhythmia type        Heart block AV second degree        Hypertensive emergency                      Subjective   Patient ID: Abhijeet Broderick is a 69 y.o. male who presents for Bradycardia.    Chief Complaint: Dizziness with low heart rate.    History of Present Illness: The patient reports episodes of dizziness associated with a low heart rate, with HR documented as low as 38 bpm. He exercises regularly but experiences difficulty breathing when he does so. His pulse is affected, though further details are unclear. He notes that Flomax causes dizziness. He recently had an upper respiratory infection but did not take any over-the-counter medications. He is not  "currently experiencing any symptoms.    Review of Systems:    Constitutional: No current symptoms.  Cardiovascular: Reports low heart rate (as low as 38 bpm). Home blood pressure readings range from 120-140 mmHg systolic.  Respiratory: Reports difficulty breathing with exercise.  Neurologic: Reports dizziness.  Medications: Takes Flomax, which contributes to dizziness.  Recent Illness: Recent URI without OTC treatment.    Objective   BP (!) 190/90 (BP Location: Left arm, Patient Position: Sitting, BP Cuff Size: Adult)   Pulse (!) 46   Temp 36.3 °C (97.3 °F) (Skin)   Ht 1.803 m (5' 11\")   Wt 99.2 kg (218 lb 9.6 oz)   SpO2 97%   BMI 30.49 kg/m²     Physical Exam:     Constitutional:   General: not in acute distress  Appearance: normal appearance, non-toxic, not ill-appearing or diaphoretic.   HENT:   Head: Normocephalic and atraumatic  Eyes: EOMI, PEREDGARDOL        Komal Thompson DO     I spent a total of 43 minutes on the date of the service which included preparing to see the patient, face-to-face patient care, completing clinical documentation, performing a medically appropriate examination, counseling and educating the patient/family/caregiver and ordering medications, tests, or procedures.   "

## 2025-01-29 NOTE — PROGRESS NOTES
Pharmacy Medication History     Source of Information: patient    Additional concerns with the patient's PTA list.   N/a  The following updates were made to the Prior to Admission medication list:     Medications ADDED:   N/a  Medications CHANGED:  N/a  Medications REMOVED:   N/a  Medications NOT TAKING:   N/a    Allergy reviewed : Yes    Meds 2 Beds : No    Outpatient pharmacy confirmed and updated in chart : Yes    [facility]: [facility]    The list below reflectives the updated PTA list. Please review each medication in order reconciliation for additional clarification and justification.    Prior to Admission Medications   Prescriptions Last Dose Informant   halobetasol (UltraVATE) 0.05 % cream  Self   Sig: APPLY TO AFFECTED AREA ON BODY TWICE DAILY FOR 2 WEEKS AS NEEDED FOR FLARES. DO NOT USE ON FACE   tamsulosin (Flomax) 0.4 mg 24 hr capsule 1/28/2025 at  5:00 PM Self   Sig: Take 1 capsule (0.4 mg) by mouth once daily.   Patient taking differently: Take 1 capsule (0.4 mg) by mouth once daily at bedtime.      Facility-Administered Medications: None       The list below reflectives the updated allergy list. Please review each documented allergy for additional clarification and justification.    No Known Allergies       01/29/25 at 2:16 PM - Leslie Woods

## 2025-01-29 NOTE — Clinical Note
dry, intact and no bleeding. Olanzapine Counseling- I discussed with the patient the common side effects of olanzapine including but are not limited to: lack of energy, dry mouth, increased appetite, sleepiness, tremor, constipation, dizziness, changes in behavior, or restlessness.  Explained that teenagers are more likely to experience headaches, abdominal pain, pain in the arms or legs, tiredness, and sleepiness.  Serious side effects include but are not limited: increased risk of death in elderly patients who are confused, have memory loss, or dementia-related psychosis; hyperglycemia; increased cholesterol and triglycerides; and weight gain.

## 2025-01-29 NOTE — ED PROVIDER NOTES
HPI   Chief Complaint   Patient presents with    Dizziness       69-year-old man with no significant past medical history who does present status post finding heart rate at 30s and primary care office.  Does describe a little bit of lightheadedness while he exercises.  Denies any chest pain or palpitation shortness of breath.  No PND, with apnea, FAJARDO.  No prior history reported of low heart rate.  No priorreported of hypertension medications.              Patient History   Past Medical History:   Diagnosis Date    Other injury of unspecified body region, initial encounter 11/23/2020    Wound of skin    Personal history of diseases of the blood and blood-forming organs and certain disorders involving the immune mechanism 01/10/2017    History of leukocytosis    Personal history of diseases of the skin and subcutaneous tissue 01/06/2017    History of eczema    Personal history of diseases of the skin and subcutaneous tissue 02/18/2019    History of actinic keratosis    Unspecified acute conjunctivitis, bilateral 02/18/2019    Conjunctivitis, acute, bilateral     Past Surgical History:   Procedure Laterality Date    BACK SURGERY      WISDOM TOOTH EXTRACTION      WRIST FRACTURE SURGERY Right      Family History   Problem Relation Name Age of Onset    Diabetes Mother  86    Diabetes Father      Hyperlipidemia Sister  69    Diabetes Sister       Social History     Tobacco Use    Smoking status: Never     Passive exposure: Never    Smokeless tobacco: Never   Substance Use Topics    Alcohol use: Never    Drug use: Never       Physical Exam   ED Triage Vitals [01/29/25 0959]   Temperature Heart Rate Respirations BP   36.3 °C (97.4 °F) 52 18 (!) 215/95      Pulse Ox Temp Source Heart Rate Source Patient Position   97 % Temporal Monitor Sitting      BP Location FiO2 (%)     Left arm --       Physical Exam  Vitals and nursing note reviewed.   Constitutional:       General: He is not in acute distress.     Appearance: Normal  appearance. He is normal weight. He is not ill-appearing.   HENT:      Head: Normocephalic and atraumatic.      Nose: Nose normal.      Mouth/Throat:      Mouth: Mucous membranes are dry.      Pharynx: Oropharynx is clear.   Eyes:      Extraocular Movements: Extraocular movements intact.      Conjunctiva/sclera: Conjunctivae normal.      Pupils: Pupils are equal, round, and reactive to light.   Cardiovascular:      Rate and Rhythm: Regular rhythm. Bradycardia present.      Pulses: Normal pulses.      Heart sounds: Normal heart sounds.   Pulmonary:      Effort: Pulmonary effort is normal.      Breath sounds: Normal breath sounds.   Abdominal:      General: Abdomen is flat. Bowel sounds are normal. There is no distension.      Palpations: Abdomen is soft.      Tenderness: There is no abdominal tenderness. There is no right CVA tenderness, left CVA tenderness, guarding or rebound.   Musculoskeletal:         General: Normal range of motion.      Cervical back: Normal range of motion and neck supple.      Right lower leg: No edema.      Left lower leg: No edema.   Skin:     General: Skin is warm and dry.      Capillary Refill: Capillary refill takes less than 2 seconds.      Coloration: Skin is not pale.      Findings: No bruising.   Neurological:      General: No focal deficit present.      Mental Status: He is alert and oriented to person, place, and time. Mental status is at baseline.      Sensory: No sensory deficit.      Motor: No weakness.   Psychiatric:         Mood and Affect: Mood normal.         Behavior: Behavior normal.         Thought Content: Thought content normal.         Judgment: Judgment normal.           ED Course & MDM   Diagnoses as of 01/29/25 1453   Heart block   Secondary hypertension                 No data recorded                                 Medical Decision Making  EKG interpreted by me showed sinus bradycardia at a rate of of 28 with no acute ischemic changes.  No STEMI.  No A-fib  IV is  placed and labs are drawn and patient is placed on cardiac pads.  The lab work did not show any elevated white blood cell count systemic infection.  No clinically significant anemia.Chest x-ray showed no sign for clear fluid overload.  No effusions  Telemetry monitoring does show sinus bradycardia under my interpretation  Cardiology is consulted for further management of significant sinus bradycardia versus second-degree heart block  Case discussed with Dr. Cisneros for admission    Amount and/or Complexity of Data Reviewed  Labs: ordered. Decision-making details documented in ED Course.  Radiology: ordered. Decision-making details documented in ED Course.  ECG/medicine tests: ordered. Decision-making details documented in ED Course.        Procedure  Procedures     Anders Doll MD  01/29/25 9646

## 2025-01-29 NOTE — CONSULTS
Consults  History Of Present Illness:    Abhijeet Broderick is a 69 y.o. male presenting with symptomatic bradycardia. No significant PMHx. He reports that for the last month he was having recurrent dizziness and SOB. Today was swimming and felt dizzy. His HR was in the low 30's and came to the ER. Telemetry in the ER showed sinus bradycardia in the low 30's with episodes of sinus arrest and junctional escape rhythm. He is no on any chronotropic negative medication. I was consulted for evaluation.      Last Recorded Vitals:  Vitals:    01/29/25 1330 01/29/25 1345 01/29/25 1400 01/29/25 1445   BP: 180/80 152/80 (!) 143/128 (!) 179/130   BP Location:       Patient Position:       Pulse: (!) 38 (!) 35 58 (!) 36   Resp: 20 19 (!) 30 (!) 26   Temp:       TempSrc:       SpO2: 95% 94% 97% (!) 93%   Weight:       Height:           Last Labs:  CBC - 1/29/2025: 10:26 AM  7.7 14.8 210    44.2      CMP - 1/29/2025: 10:26 AM  9.1 6.7 16 --- 1.0   _ 4.4 16 74      PTT - 1/29/2025: 10:26 AM  1.1   11.9 27     Troponin I, High Sensitivity   Date/Time Value Ref Range Status   01/29/2025 11:38 AM 13 0 - 20 ng/L Final   01/29/2025 10:26 AM 12 0 - 20 ng/L Final     BNP   Date/Time Value Ref Range Status   01/29/2025 10:26  (H) 0 - 99 pg/mL Final     Hemoglobin A1C   Date/Time Value Ref Range Status   01/04/2024 10:31 AM 5.6 see below % Final     LDL Calculated   Date/Time Value Ref Range Status   01/04/2024 10:31 AM 81 <=99 mg/dL Final     Comment:                                 Near   Borderline      AGE      Desirable  Optimal    High     High     Very High     0-19 Y     0 - 109     ---    110-129   >/= 130     ----    20-24 Y     0 - 119     ---    120-159   >/= 160     ----      >24 Y     0 -  99   100-129  130-159   160-189     >/=190       VLDL   Date/Time Value Ref Range Status   01/04/2024 10:31 AM 53 (H) 0 - 40 mg/dL Final   09/13/2022 11:50 AM 45 (H) 0 - 40 mg/dL Final   09/15/2021 09:21 AM 28 0 - 40 mg/dL Final    11/23/2020 08:32 AM 29 0 - 40 mg/dL Final      Last I/O:  No intake/output data recorded.    Past Cardiology Tests (Last 3 Years):  EKG:  ECG 12 lead 01/29/2025 (Preliminary)      ECG 12 lead (Clinic Performed) 01/29/2025    Echo:  Transthoracic Echo (TTE) Complete 01/29/2025    Ejection Fractions:  EF   Date/Time Value Ref Range Status   01/29/2025 01:26 PM 66 %      Cath:  No results found for this or any previous visit from the past 1095 days.    Stress Test:  No results found for this or any previous visit from the past 1095 days.    Cardiac Imaging:  No results found for this or any previous visit from the past 1095 days.      Past Medical History:  He has a past medical history of Other injury of unspecified body region, initial encounter (11/23/2020), Personal history of diseases of the blood and blood-forming organs and certain disorders involving the immune mechanism (01/10/2017), Personal history of diseases of the skin and subcutaneous tissue (01/06/2017), Personal history of diseases of the skin and subcutaneous tissue (02/18/2019), and Unspecified acute conjunctivitis, bilateral (02/18/2019).    Past Surgical History:  He has a past surgical history that includes Dennison tooth extraction; Wrist fracture surgery (Right); and Back surgery.      Social History:  He reports that he has never smoked. He has never been exposed to tobacco smoke. He has never used smokeless tobacco. He reports that he does not drink alcohol and does not use drugs.    Family History:  Family History   Problem Relation Name Age of Onset    Diabetes Mother  86    Diabetes Father      Hyperlipidemia Sister  69    Diabetes Sister          Allergies:  Patient has no known allergies.    Inpatient Medications:        Outpatient Medications:  Current Outpatient Medications   Medication Instructions    halobetasol (UltraVATE) 0.05 % cream APPLY TO AFFECTED AREA ON BODY TWICE DAILY FOR 2 WEEKS AS NEEDED FOR FLARES. DO NOT USE ON FACE     tamsulosin (FLOMAX) 0.4 mg, oral, Daily            Assessment/Plan   At the time of my visit the patient was in sinus bradycardia hemodynamically stable. His echocardiogram showed a normal heart with a preserved LVEF. He meets criteria for PPM implant. All the R/B/A of the procedure were discussed with the patient whop expressed  understanding and agree to proceed. Will get him in the schedule for tomorrow.       Peripheral IV 01/29/25 20 G Left Antecubital (Active)   Site Assessment Clean;Dry;Intact 01/29/25 1025   Dressing Type Transparent 01/29/25 1025   Line Status Flushed 01/29/25 1025   Dressing Status Clean;Dry;Occlusive 01/29/25 1025   Number of days: 0       Code Status:  No Order    I spent 20 minutes in the professional and overall care of this patient.        Edmund Garcia MD

## 2025-01-30 DIAGNOSIS — I45.9 HEART BLOCK: Primary | ICD-10-CM

## 2025-01-30 LAB
ANION GAP SERPL CALC-SCNC: 8 MMOL/L (ref 10–20)
BASOPHILS # BLD AUTO: 0.08 X10*3/UL (ref 0–0.1)
BASOPHILS NFR BLD AUTO: 0.9 %
BUN SERPL-MCNC: 20 MG/DL (ref 6–23)
CALCIUM SERPL-MCNC: 8.5 MG/DL (ref 8.6–10.3)
CHLORIDE SERPL-SCNC: 107 MMOL/L (ref 98–107)
CO2 SERPL-SCNC: 30 MMOL/L (ref 21–32)
CREAT SERPL-MCNC: 1.1 MG/DL (ref 0.5–1.3)
EGFRCR SERPLBLD CKD-EPI 2021: 74 ML/MIN/1.73M*2
EOSINOPHIL # BLD AUTO: 0.48 X10*3/UL (ref 0–0.7)
EOSINOPHIL NFR BLD AUTO: 5.7 %
ERYTHROCYTE [DISTWIDTH] IN BLOOD BY AUTOMATED COUNT: 13.2 % (ref 11.5–14.5)
GLUCOSE SERPL-MCNC: 106 MG/DL (ref 74–99)
HCT VFR BLD AUTO: 42.9 % (ref 41–52)
HGB BLD-MCNC: 14.1 G/DL (ref 13.5–17.5)
IMM GRANULOCYTES # BLD AUTO: 0.03 X10*3/UL (ref 0–0.7)
IMM GRANULOCYTES NFR BLD AUTO: 0.4 % (ref 0–0.9)
LYMPHOCYTES # BLD AUTO: 1.87 X10*3/UL (ref 1.2–4.8)
LYMPHOCYTES NFR BLD AUTO: 22.2 %
MCH RBC QN AUTO: 28.8 PG (ref 26–34)
MCHC RBC AUTO-ENTMCNC: 32.9 G/DL (ref 32–36)
MCV RBC AUTO: 88 FL (ref 80–100)
MONOCYTES # BLD AUTO: 1.04 X10*3/UL (ref 0.1–1)
MONOCYTES NFR BLD AUTO: 12.3 %
NEUTROPHILS # BLD AUTO: 4.93 X10*3/UL (ref 1.2–7.7)
NEUTROPHILS NFR BLD AUTO: 58.5 %
NRBC BLD-RTO: 0 /100 WBCS (ref 0–0)
PLATELET # BLD AUTO: 201 X10*3/UL (ref 150–450)
POTASSIUM SERPL-SCNC: 4.2 MMOL/L (ref 3.5–5.3)
RBC # BLD AUTO: 4.89 X10*6/UL (ref 4.5–5.9)
SODIUM SERPL-SCNC: 141 MMOL/L (ref 136–145)
WBC # BLD AUTO: 8.4 X10*3/UL (ref 4.4–11.3)

## 2025-01-30 PROCEDURE — C1898 LEAD, PMKR, OTHER THAN TRANS: HCPCS | Performed by: INTERNAL MEDICINE

## 2025-01-30 PROCEDURE — 2720000007 HC OR 272 NO HCPCS: Performed by: INTERNAL MEDICINE

## 2025-01-30 PROCEDURE — 2500000004 HC RX 250 GENERAL PHARMACY W/ HCPCS (ALT 636 FOR OP/ED): Performed by: NURSE PRACTITIONER

## 2025-01-30 PROCEDURE — 33208 INSRT HEART PM ATRIAL & VENT: CPT | Performed by: INTERNAL MEDICINE

## 2025-01-30 PROCEDURE — 2500000004 HC RX 250 GENERAL PHARMACY W/ HCPCS (ALT 636 FOR OP/ED): Performed by: INTERNAL MEDICINE

## 2025-01-30 PROCEDURE — 99152 MOD SED SAME PHYS/QHP 5/>YRS: CPT | Performed by: INTERNAL MEDICINE

## 2025-01-30 PROCEDURE — 99223 1ST HOSP IP/OBS HIGH 75: CPT | Performed by: NURSE PRACTITIONER

## 2025-01-30 PROCEDURE — 36415 COLL VENOUS BLD VENIPUNCTURE: CPT | Performed by: INTERNAL MEDICINE

## 2025-01-30 PROCEDURE — 2500000005 HC RX 250 GENERAL PHARMACY W/O HCPCS: Performed by: INTERNAL MEDICINE

## 2025-01-30 PROCEDURE — 0JH606Z INSERTION OF PACEMAKER, DUAL CHAMBER INTO CHEST SUBCUTANEOUS TISSUE AND FASCIA, OPEN APPROACH: ICD-10-PCS | Performed by: INTERNAL MEDICINE

## 2025-01-30 PROCEDURE — C1892 INTRO/SHEATH,FIXED,PEEL-AWAY: HCPCS | Performed by: INTERNAL MEDICINE

## 2025-01-30 PROCEDURE — 7100000009 HC PHASE TWO TIME - INITIAL BASE CHARGE: Performed by: INTERNAL MEDICINE

## 2025-01-30 PROCEDURE — 2500000001 HC RX 250 WO HCPCS SELF ADMINISTERED DRUGS (ALT 637 FOR MEDICARE OP): Performed by: NURSE PRACTITIONER

## 2025-01-30 PROCEDURE — 99233 SBSQ HOSP IP/OBS HIGH 50: CPT | Performed by: STUDENT IN AN ORGANIZED HEALTH CARE EDUCATION/TRAINING PROGRAM

## 2025-01-30 PROCEDURE — C1785 PMKR, DUAL, RATE-RESP: HCPCS | Performed by: INTERNAL MEDICINE

## 2025-01-30 PROCEDURE — 80048 BASIC METABOLIC PNL TOTAL CA: CPT | Performed by: INTERNAL MEDICINE

## 2025-01-30 PROCEDURE — 2060000001 HC INTERMEDIATE ICU ROOM DAILY

## 2025-01-30 PROCEDURE — 2750000001 HC OR 275 NO HCPCS: Performed by: INTERNAL MEDICINE

## 2025-01-30 PROCEDURE — 2500000002 HC RX 250 W HCPCS SELF ADMINISTERED DRUGS (ALT 637 FOR MEDICARE OP, ALT 636 FOR OP/ED): Performed by: NURSE PRACTITIONER

## 2025-01-30 PROCEDURE — RXMED WILLOW AMBULATORY MEDICATION CHARGE

## 2025-01-30 PROCEDURE — 02H63JZ INSERTION OF PACEMAKER LEAD INTO RIGHT ATRIUM, PERCUTANEOUS APPROACH: ICD-10-PCS | Performed by: INTERNAL MEDICINE

## 2025-01-30 PROCEDURE — 7100000010 HC PHASE TWO TIME - EACH INCREMENTAL 1 MINUTE: Performed by: INTERNAL MEDICINE

## 2025-01-30 PROCEDURE — 85025 COMPLETE CBC W/AUTO DIFF WBC: CPT | Performed by: INTERNAL MEDICINE

## 2025-01-30 PROCEDURE — 02HK3JZ INSERTION OF PACEMAKER LEAD INTO RIGHT VENTRICLE, PERCUTANEOUS APPROACH: ICD-10-PCS | Performed by: INTERNAL MEDICINE

## 2025-01-30 DEVICE — DR PACEMAKER DDDR
Type: IMPLANTABLE DEVICE | Site: HEART | Status: FUNCTIONAL
Brand: ASSURITY MRI™

## 2025-01-30 DEVICE — PACING LEAD
Type: IMPLANTABLE DEVICE | Site: HEART | Status: FUNCTIONAL
Brand: ULTIPACE™

## 2025-01-30 RX ORDER — BUPIVACAINE HYDROCHLORIDE 2.5 MG/ML
INJECTION, SOLUTION EPIDURAL; INFILTRATION; INTRACAUDAL AS NEEDED
Status: DISCONTINUED | OUTPATIENT
Start: 2025-01-30 | End: 2025-01-30 | Stop reason: HOSPADM

## 2025-01-30 RX ORDER — HYDRALAZINE HYDROCHLORIDE 20 MG/ML
10 INJECTION INTRAMUSCULAR; INTRAVENOUS EVERY 4 HOURS PRN
Status: DISCONTINUED | OUTPATIENT
Start: 2025-01-30 | End: 2025-01-31 | Stop reason: HOSPADM

## 2025-01-30 RX ORDER — MIDAZOLAM HYDROCHLORIDE 1 MG/ML
INJECTION, SOLUTION INTRAMUSCULAR; INTRAVENOUS AS NEEDED
Status: DISCONTINUED | OUTPATIENT
Start: 2025-01-30 | End: 2025-01-30 | Stop reason: HOSPADM

## 2025-01-30 RX ORDER — ACETAMINOPHEN 325 MG/1
650 TABLET ORAL EVERY 4 HOURS PRN
COMMUNITY
Start: 2025-01-30

## 2025-01-30 RX ORDER — ONDANSETRON HYDROCHLORIDE 2 MG/ML
4 INJECTION, SOLUTION INTRAVENOUS EVERY 8 HOURS PRN
Status: DISCONTINUED | OUTPATIENT
Start: 2025-01-30 | End: 2025-01-31 | Stop reason: HOSPADM

## 2025-01-30 RX ORDER — FENTANYL CITRATE 50 UG/ML
INJECTION, SOLUTION INTRAMUSCULAR; INTRAVENOUS AS NEEDED
Status: DISCONTINUED | OUTPATIENT
Start: 2025-01-30 | End: 2025-01-30 | Stop reason: HOSPADM

## 2025-01-30 RX ORDER — CEFAZOLIN SODIUM 2 G/100ML
2 INJECTION, SOLUTION INTRAVENOUS ONCE
Status: COMPLETED | OUTPATIENT
Start: 2025-01-30 | End: 2025-01-30

## 2025-01-30 RX ORDER — LIDOCAINE HYDROCHLORIDE 10 MG/ML
INJECTION, SOLUTION EPIDURAL; INFILTRATION; INTRACAUDAL; PERINEURAL AS NEEDED
Status: DISCONTINUED | OUTPATIENT
Start: 2025-01-30 | End: 2025-01-30 | Stop reason: HOSPADM

## 2025-01-30 RX ORDER — TALC
3 POWDER (GRAM) TOPICAL NIGHTLY PRN
Status: DISCONTINUED | OUTPATIENT
Start: 2025-01-30 | End: 2025-01-31 | Stop reason: HOSPADM

## 2025-01-30 RX ORDER — TRAMADOL HYDROCHLORIDE 50 MG/1
50 TABLET ORAL EVERY 6 HOURS PRN
Status: DISCONTINUED | OUTPATIENT
Start: 2025-01-30 | End: 2025-01-31 | Stop reason: HOSPADM

## 2025-01-30 RX ORDER — ACETAMINOPHEN 650 MG/1
650 SUPPOSITORY RECTAL EVERY 4 HOURS PRN
Status: DISCONTINUED | OUTPATIENT
Start: 2025-01-30 | End: 2025-01-31 | Stop reason: HOSPADM

## 2025-01-30 RX ORDER — DOXYCYCLINE HYCLATE 100 MG
100 TABLET ORAL EVERY 12 HOURS SCHEDULED
Status: DISCONTINUED | OUTPATIENT
Start: 2025-01-30 | End: 2025-01-31 | Stop reason: HOSPADM

## 2025-01-30 RX ORDER — ONDANSETRON 4 MG/1
4 TABLET, FILM COATED ORAL EVERY 8 HOURS PRN
Status: DISCONTINUED | OUTPATIENT
Start: 2025-01-30 | End: 2025-01-31 | Stop reason: HOSPADM

## 2025-01-30 RX ORDER — ACETAMINOPHEN 325 MG/1
650 TABLET ORAL EVERY 4 HOURS PRN
Status: DISCONTINUED | OUTPATIENT
Start: 2025-01-30 | End: 2025-01-31 | Stop reason: HOSPADM

## 2025-01-30 RX ORDER — TAMSULOSIN HYDROCHLORIDE 0.4 MG/1
0.4 CAPSULE ORAL DAILY
Status: DISCONTINUED | OUTPATIENT
Start: 2025-01-30 | End: 2025-01-31 | Stop reason: HOSPADM

## 2025-01-30 RX ORDER — DOXYCYCLINE 100 MG/1
100 CAPSULE ORAL EVERY 12 HOURS SCHEDULED
Qty: 14 CAPSULE | Refills: 0 | Status: SHIPPED | OUTPATIENT
Start: 2025-01-30 | End: 2025-02-07

## 2025-01-30 RX ORDER — ACETAMINOPHEN 160 MG/5ML
650 SOLUTION ORAL EVERY 4 HOURS PRN
Status: DISCONTINUED | OUTPATIENT
Start: 2025-01-30 | End: 2025-01-31 | Stop reason: HOSPADM

## 2025-01-30 RX ADMIN — CEFAZOLIN SODIUM 2 G: 2 INJECTION, SOLUTION INTRAVENOUS at 15:30

## 2025-01-30 RX ADMIN — TAMSULOSIN HYDROCHLORIDE 0.4 MG: 0.4 CAPSULE ORAL at 17:51

## 2025-01-30 RX ADMIN — DOXYCYCLINE HYCLATE 100 MG: 100 TABLET, COATED ORAL at 21:35

## 2025-01-30 SDOH — SOCIAL STABILITY: SOCIAL INSECURITY: WITHIN THE LAST YEAR, HAVE YOU BEEN HUMILIATED OR EMOTIONALLY ABUSED IN OTHER WAYS BY YOUR PARTNER OR EX-PARTNER?: NO

## 2025-01-30 SDOH — SOCIAL STABILITY: SOCIAL INSECURITY: DO YOU FEEL ANYONE HAS EXPLOITED OR TAKEN ADVANTAGE OF YOU FINANCIALLY OR OF YOUR PERSONAL PROPERTY?: NO

## 2025-01-30 SDOH — SOCIAL STABILITY: SOCIAL INSECURITY: ARE YOU OR HAVE YOU BEEN THREATENED OR ABUSED PHYSICALLY, EMOTIONALLY, OR SEXUALLY BY ANYONE?: NO

## 2025-01-30 SDOH — SOCIAL STABILITY: SOCIAL INSECURITY
WITHIN THE LAST YEAR, HAVE YOU BEEN KICKED, HIT, SLAPPED, OR OTHERWISE PHYSICALLY HURT BY YOUR PARTNER OR EX-PARTNER?: NO

## 2025-01-30 SDOH — ECONOMIC STABILITY: FOOD INSECURITY: WITHIN THE PAST 12 MONTHS, YOU WORRIED THAT YOUR FOOD WOULD RUN OUT BEFORE YOU GOT THE MONEY TO BUY MORE.: NEVER TRUE

## 2025-01-30 SDOH — ECONOMIC STABILITY: FOOD INSECURITY: WITHIN THE PAST 12 MONTHS, THE FOOD YOU BOUGHT JUST DIDN'T LAST AND YOU DIDN'T HAVE MONEY TO GET MORE.: NEVER TRUE

## 2025-01-30 SDOH — SOCIAL STABILITY: SOCIAL INSECURITY: ABUSE: ADULT

## 2025-01-30 SDOH — SOCIAL STABILITY: SOCIAL INSECURITY: WERE YOU ABLE TO COMPLETE ALL THE BEHAVIORAL HEALTH SCREENINGS?: YES

## 2025-01-30 SDOH — SOCIAL STABILITY: SOCIAL INSECURITY: HAVE YOU HAD ANY THOUGHTS OF HARMING ANYONE ELSE?: NO

## 2025-01-30 SDOH — ECONOMIC STABILITY: INCOME INSECURITY: IN THE PAST 12 MONTHS HAS THE ELECTRIC, GAS, OIL, OR WATER COMPANY THREATENED TO SHUT OFF SERVICES IN YOUR HOME?: NO

## 2025-01-30 SDOH — SOCIAL STABILITY: SOCIAL INSECURITY: DOES ANYONE TRY TO KEEP YOU FROM HAVING/CONTACTING OTHER FRIENDS OR DOING THINGS OUTSIDE YOUR HOME?: NO

## 2025-01-30 SDOH — SOCIAL STABILITY: SOCIAL INSECURITY: ARE THERE ANY APPARENT SIGNS OF INJURIES/BEHAVIORS THAT COULD BE RELATED TO ABUSE/NEGLECT?: NO

## 2025-01-30 SDOH — SOCIAL STABILITY: SOCIAL INSECURITY
WITHIN THE LAST YEAR, HAVE YOU BEEN RAPED OR FORCED TO HAVE ANY KIND OF SEXUAL ACTIVITY BY YOUR PARTNER OR EX-PARTNER?: NO

## 2025-01-30 SDOH — SOCIAL STABILITY: SOCIAL INSECURITY: DO YOU FEEL UNSAFE GOING BACK TO THE PLACE WHERE YOU ARE LIVING?: NO

## 2025-01-30 SDOH — SOCIAL STABILITY: SOCIAL INSECURITY: HAS ANYONE EVER THREATENED TO HURT YOUR FAMILY OR YOUR PETS?: NO

## 2025-01-30 SDOH — SOCIAL STABILITY: SOCIAL INSECURITY: WITHIN THE LAST YEAR, HAVE YOU BEEN AFRAID OF YOUR PARTNER OR EX-PARTNER?: NO

## 2025-01-30 SDOH — SOCIAL STABILITY: SOCIAL INSECURITY: HAVE YOU HAD THOUGHTS OF HARMING ANYONE ELSE?: NO

## 2025-01-30 ASSESSMENT — LIFESTYLE VARIABLES
AUDIT-C TOTAL SCORE: 0
HOW MANY STANDARD DRINKS CONTAINING ALCOHOL DO YOU HAVE ON A TYPICAL DAY: PATIENT DOES NOT DRINK
HOW OFTEN DO YOU HAVE A DRINK CONTAINING ALCOHOL: NEVER
SKIP TO QUESTIONS 9-10: 1
AUDIT-C TOTAL SCORE: 0
HOW OFTEN DO YOU HAVE 6 OR MORE DRINKS ON ONE OCCASION: NEVER

## 2025-01-30 ASSESSMENT — COGNITIVE AND FUNCTIONAL STATUS - GENERAL
PATIENT BASELINE BEDBOUND: NO
DAILY ACTIVITIY SCORE: 24
MOBILITY SCORE: 24
MOBILITY SCORE: 24
DAILY ACTIVITIY SCORE: 24

## 2025-01-30 ASSESSMENT — ACTIVITIES OF DAILY LIVING (ADL)
DRESSING YOURSELF: INDEPENDENT
LACK_OF_TRANSPORTATION: NO
GROOMING: INDEPENDENT
JUDGMENT_ADEQUATE_SAFELY_COMPLETE_DAILY_ACTIVITIES: YES
HEARING - RIGHT EAR: FUNCTIONAL
ADEQUATE_TO_COMPLETE_ADL: YES
HEARING - LEFT EAR: FUNCTIONAL
BATHING: INDEPENDENT
TOILETING: INDEPENDENT
WALKS IN HOME: INDEPENDENT
PATIENT'S MEMORY ADEQUATE TO SAFELY COMPLETE DAILY ACTIVITIES?: YES
FEEDING YOURSELF: INDEPENDENT
LACK_OF_TRANSPORTATION: NO

## 2025-01-30 ASSESSMENT — ENCOUNTER SYMPTOMS
CONSTITUTIONAL NEGATIVE: 1
EYES NEGATIVE: 1
HEMATOLOGIC/LYMPHATIC NEGATIVE: 1
MUSCULOSKELETAL NEGATIVE: 1
RESPIRATORY NEGATIVE: 1
GASTROINTESTINAL NEGATIVE: 1
NEUROLOGICAL NEGATIVE: 1
CARDIOVASCULAR NEGATIVE: 1
ALLERGIC/IMMUNOLOGIC NEGATIVE: 1
DIFFICULTY URINATING: 1
ENDOCRINE NEGATIVE: 1
DIZZINESS: 1
PSYCHIATRIC NEGATIVE: 1

## 2025-01-30 ASSESSMENT — PAIN SCALES - GENERAL
PAINLEVEL_OUTOF10: 0 - NO PAIN

## 2025-01-30 ASSESSMENT — PAIN - FUNCTIONAL ASSESSMENT
PAIN_FUNCTIONAL_ASSESSMENT: 0-10

## 2025-01-30 ASSESSMENT — PATIENT HEALTH QUESTIONNAIRE - PHQ9
SUM OF ALL RESPONSES TO PHQ9 QUESTIONS 1 & 2: 0
1. LITTLE INTEREST OR PLEASURE IN DOING THINGS: NOT AT ALL
2. FEELING DOWN, DEPRESSED OR HOPELESS: NOT AT ALL

## 2025-01-30 NOTE — DISCHARGE INSTRUCTIONS
Home-going Instructions After Device Implant    Incision:  Please keep your incision dry and/or open to air for one week after implant (date: __2/6/25________).    _______Remove white rectangular outer island dressing tomorrow (date: _1/31/25________)                           _______ If you have skin glue, please keep your incision dry for 1 week. Do not remove it, as it will peel off by itself.  _______If you have steri strips, please keep your incision dry for 1 week. Steri strips may be removed after one week/will fall off on their own.  _______ If you have an occlusive dressing like Aquacel (tan-colored bandage) or Mepilex border, please leave on for one week.  Do not remove the steri strips, they will fall off on their own    Call the Device Clinic at [phone] if you have any questions about your instructions, Monday-Friday between the hours of 7:00 am - 4:30 pm. After hours please call your physician's office.   After one week, you may wash the site with soap and water.   Inspect your incision each day.  If you note increased redness, swelling, or drainage, or if you develop a fever, please call your doctor IMMEDIATELY.     Your Doctor:  Dr. Edmund Garcia    Telephone:  [phone]    Pain:  It is normal for the area around the incision to be tender for a few weeks following surgery.  Pain relievers such as Tylenol or Motrin are usually sufficient for pain relief.  The pain should get better each day, if it gets worse, please contact your doctor.    Activity Restrictions: new implant 4 weeks (date:___2/27/25___________) / device replacement 1 week __________________.  Avoid gross arm movement on the side of your new implant.  Do not raise or stretch your arm above shoulder level.  Do not pick-up weights greater than 15 lbs.  Avoid activities such as golf or swimming for six weeks.      Driving: Please do not resume driving for 1 week(s) date: ___2/6/25______________.     ID Card:  It is important that  you carry your device ID card with you at all times.  Inform all doctors and healthcare providers that you have a pacemaker or defibrillator.  Until the lead wires are completely healed in your heart, a prophylactic antibiotic may need to be given to you prior to procedures such as deep cleaning or extraction of teeth.    Electromagnetic Interference:  Microwave ovens are safe to use.  Cellular telephones should be held to the ear that is opposite your device.  If you are scheduled for a MRI, please notify the Device Clinic to check if you have a compatible device.  Present your device ID card to the airport .  The detector wand may be used below waist level and to inspect your shoes.  Read the patient booklet for more information.  You may call the device clinic or the patient services department of the device  with questions about specific electrical appliances and interference problems.    It is your responsibility to make and keep appointments. After each visit on your way out, make an appointment for your next visit. Please follow the recommendations of your doctor for the frequency of appointments.          [facility]  [address]  [address] [place] [address]   [place] [facility] [address]  [address] [place] [address]   [facility] [address]  [address] [place] [address]      [facility] [address] [place] [address]   [facility]  [address] [place] [address]     [facility] [address]  [address] [place] [address]     [facility]  [address] [place], [place] [address]   [facility] [address]  [address] [place] [address]   [facility] [address]  [address] [place]  [address]     [facility]  [address] [place] [address]    [facility]  [address]  [place], [place] [address]   [facility]  [address] [place] [address]       [facility] [address]  [address] [place] [address]   [facility]  ([facility])  [address] [place] [address]   [facility]  [address] [facility]VI    [place], [place] [address]     Appointment Schedulin605 [phone]   Device Clinic: [phone] (this is Not an emergency number)    Frequent Questions from ICD Patients    What Does a Shock Feel Like?  - Many patients describe a shock as a hard punch or kick to the chest. The discomfort is over  quickly and does not remain like an actual punch or kick.    What Should I Do If I Get a Shock?  - Remain calm -your ICD is working.  - If you are feeling well after your shock, call your ICD doctor. If you are not feeling well, call  911 and seek emergency treatment.  - Family members should also know what to do if you get a shock.  - Establish an emergency plan with family members and friends. Instruction in CPR is  recommended.    Can I Travel?  -Always carry your ICD identification card.  -Ask to be hand searched at security checkpoints, such as at the airport or your local courthouse.    Hand wands are not a substitute for a “pat down” search. Surveillance wands may be used  below your waist and on your shoes.  -If you are making an extended trip to another city, your ICD doctor may recommend a  physician or hospital that you can contact.    Can I Drive?  -Your doctor will specify any driving restrictions.    Other Points:  -Always notify healthcare providers, such as dentists and podiatrists, that you have an ICD. If  any surgery is planned for you, the anesthesia personnel must know in advance about your ICD.    Other Sources of Information:  Device Clinic Nurses: [phone]  Patient Services Department of the  of your ICD  The Internet holds  an abundance of information. Some internet sites of interest:  www.medtronic.com  www.[employer].Store [employer]antage  www.Wisegate.com  www.heartrhythm.com  www.[employer]antage    This info is a general resource. It is not meant to replace your health care provider’s advice.  Ask your doctor or health care team any questions. Always follow their instructions.         Follow-up cardiology and PCP within 2 weeks.    Low-sodium diet, activity as tolerated.

## 2025-01-30 NOTE — PROGRESS NOTES
Transitional Care Coordination Progress Note:  Plan per Medical/Surgical team: treatment of heart block with Cardio surgery consult for Pacer insertion today   Status: Inpatient   Payor source: [employer]  Discharge disposition: Home with wife   Potential Barriers: HR 32  ADOD: 1/31/2025  ROSALINDA Romero RN, BSN Transitional Care Coordinator ED# [phone]      01/30/25 1031   Discharge Planning   Living Arrangements Spouse/significant other   Support Systems Spouse/significant other   Assistance Needed Cardio surgery consult   Pacer insertion today   Type of Residence Private residence   Number of Stairs to Enter Residence 3   Number of Stairs Within Residence 21   Home or Post Acute Services None   Expected Discharge Disposition Home   Does the patient need discharge transport arranged? No   Financial Resource Strain   How hard is it for you to pay for the very basics like food, housing, medical care, and heating? Not hard   Housing Stability   In the last 12 months, was there a time when you were not able to pay the mortgage or rent on time? N   In the past 12 months, how many times have you moved where you were living? 1   At any time in the past 12 months, were you homeless or living in a shelter (including now)? N   Transportation Needs   In the past 12 months, has lack of transportation kept you from medical appointments or from getting medications? no   In the past 12 months, has lack of transportation kept you from meetings, work, or from getting things needed for daily living? No   Stroke Family Assessment   Stroke Family Assessment Needed No   Intensity of Service   Intensity of Service 0-30 min

## 2025-01-30 NOTE — PROGRESS NOTES
01/30/25 1030   ACS Disability Status   Are you deaf or do you have serious difficulty hearing? N   Are you blind or do you have serious difficulty seeing, even when wearing glasses? N   Because of a physical, mental, or emotional condition, do you have serious difficulty concentrating, remembering, or making decisions? (5 years old or older) N   Do you have serious difficulty walking or climbing stairs? N   Do you have serious difficulty dressing or bathing? N   Because of a physical, mental, or emotional condition, do you have serious difficulty doing errands alone such as visiting the doctor? N

## 2025-01-30 NOTE — H&P
History Of Present Illness  Abhijeet Broderick is a 69 y.o. male presenting with sinus node dysfunction, here for PPM placement. PMH includes hyperglyceridemia, BPH.     Past Medical History:  Past Medical History:   Diagnosis Date    Other injury of unspecified body region, initial encounter 11/23/2020    Wound of skin    Personal history of diseases of the blood and blood-forming organs and certain disorders involving the immune mechanism 01/10/2017    History of leukocytosis    Personal history of diseases of the skin and subcutaneous tissue 01/06/2017    History of eczema    Personal history of diseases of the skin and subcutaneous tissue 02/18/2019    History of actinic keratosis    Unspecified acute conjunctivitis, bilateral 02/18/2019    Conjunctivitis, acute, bilateral        Past Surgical History:  Past Surgical History:   Procedure Laterality Date    BACK SURGERY      WISDOM TOOTH EXTRACTION      WRIST FRACTURE SURGERY Right           Social History:   reports that he has never smoked. He has never been exposed to tobacco smoke. He has never used smokeless tobacco. He reports that he does not drink alcohol and does not use drugs.     Family History:  Family History   Problem Relation Name Age of Onset    Diabetes Mother  86    Diabetes Father      Hyperlipidemia Sister  69    Diabetes Sister          Allergies:  No Known Allergies     Home Medications:  Current Outpatient Medications   Medication Instructions    halobetasol (UltraVATE) 0.05 % cream APPLY TO AFFECTED AREA ON BODY TWICE DAILY FOR 2 WEEKS AS NEEDED FOR FLARES. DO NOT USE ON FACE    tamsulosin (FLOMAX) 0.4 mg, oral, Daily       Inpatient Medications:            Review of Systems   Constitutional: Negative.    HENT: Negative.     Eyes: Negative.    Respiratory: Negative.     Cardiovascular: Negative.    Gastrointestinal: Negative.    Endocrine: Negative.    Genitourinary: Negative.    Musculoskeletal: Negative.    Skin: Negative.   "  Allergic/Immunologic: Negative.    Neurological: Negative.    Hematological: Negative.    Psychiatric/Behavioral: Negative.            Physical Exam  Constitutional:       General: He is awake. He is not in acute distress.     Appearance: He is not ill-appearing.   Cardiovascular:      Rate and Rhythm: Regular rhythm. Bradycardia present.      Pulses: Normal pulses.           Radial pulses are 2+ on the right side and 2+ on the left side.        Dorsalis pedis pulses are 2+ on the right side and 2+ on the left side.      Heart sounds: Normal heart sounds. No murmur heard.  Pulmonary:      Effort: Pulmonary effort is normal.      Breath sounds: Normal breath sounds and air entry.   Abdominal:      General: Bowel sounds are normal.      Palpations: Abdomen is soft.      Tenderness: There is no abdominal tenderness.   Musculoskeletal:      Right lower leg: No edema.      Left lower leg: No edema.   Skin:     General: Skin is warm and dry.   Neurological:      General: No focal deficit present.      Mental Status: He is alert and oriented to person, place, and time.      GCS: GCS eye subscore is 4. GCS verbal subscore is 5. GCS motor subscore is 6.   Psychiatric:         Mood and Affect: Mood normal.         Behavior: Behavior is cooperative.        Sedation Plan    ASA 3     Mallampati class: III.           NPO since last night around 1730    Last Recorded Vitals  Blood pressure 137/78, pulse (!) 32, temperature 36.3 °C (97.4 °F), temperature source Temporal, resp. rate (!) 21, height 1.803 m (5' 11\"), weight 98.9 kg (218 lb), SpO2 94%.         Vitals from the Past 24 Hours  Heart Rate:  [29-58]   Resp:  [20-30]   BP: (126-206)/()   SpO2:  [93 %-97 %]          Relevant Results    Labs      CBC:   Recent Labs     01/30/25  0440 01/29/25  1026 01/04/24  1031 09/13/22  1150 09/15/21  0921 11/23/20  0832   WBC 8.4 7.7 8.1 6.7 8.1 7.6   HGB 14.1 14.8 16.4 14.9 16.4 16.1   HCT 42.9 44.2 48.1 44.6 51.0 48.7    " "210 254 272 259 233   MCV 88 86 86 88 93 90     BMP/CMP:   Recent Labs     01/30/25  0440 01/29/25  1026 01/04/24  1031 09/13/22  1150 09/15/21  0921 11/23/20  0832 11/21/19  0740    139 140 140 144 141 143   K 4.2 4.8 4.5 4.5 4.5 4.2 4.4    105 103 102 104 102 104   BUN 20 19 20 20 25* 20 22   CREATININE 1.10 0.97 0.98 0.81 1.03 1.00 0.84   CO2 30 31 31 29 31 32 28   CALCIUM 8.5* 9.1 9.4 9.2 9.4 9.4 9.4   PROT  --  6.7 6.8 6.6 7.2 6.7 6.7   BILITOT  --  1.0 0.9 0.8 1.0 0.8 0.8   ALKPHOS  --  74 69 74 85 85 87   ALT  --  16 22 32 23 22 21   AST  --  16 20 38 24 21 20   GLUCOSE 106* 100* 94 93 97 97 98      Magnesium:   Recent Labs     01/29/25  1026   MG 2.13     Lipid Panel:   Recent Labs     01/04/24  1031 09/13/22  1150 09/15/21  0921 11/23/20  0832 11/21/19  0740 08/21/18  1005   CHOL 180 176 194 166 185 198   HDL 46.4 51.6 54.7 46.3 50.1 49.8   CHHDL 3.9 3.4 3.5 3.6 3.7 4.0   VLDL 53* 45* 28 29 26 32   TRIG 263* 223* 138 145 132 160*   NHDL 134 124  --   --   --   --      Cardiac   Results from last 7 days   Lab Units 01/29/25  1138 01/29/25  1026   TROPHS ng/L 13 12   BNP pg/mL  --  318*      Hemoglobin A1C:   Recent Labs     01/04/24  1031   HGBA1C 5.6     TSH/ Free T4:   Recent Labs     01/29/25  1026 09/13/22  1150   TSH 2.35 2.19   FREET4  --  0.63     Iron:   Recent Labs     01/29/25  1026   *     Coag:   Results from last 7 days   Lab Units 01/29/25  1026   INR  1.1     ABO: No results found for: \"ABO\"    Past Cardiology Tests (Last 3 Years):    EKG:  Recent Labs     01/29/25  1037   ATRRATE 28   VENTRATE 28   PRINT 186   QRSDUR 80   QTCFRED 363   QTCCALCB 319     Encounter Date: 01/29/25   ECG 12 lead   Result Value    Ventricular Rate 28    Atrial Rate 28    ME Interval 186    QRS Duration 80    QT Interval 468    QTC Calculation(Bazett) 319    P Axis 28    R Axis -16    T Axis 4    QRS Count 5    Q Onset 219    P Onset 126    P Offset 181    T Offset 453    QTC Fredericia 363    " Narrative    Marked sinus bradycardia  Inferior infarct (cited on or before 10-CLAUDE-2013)  Cannot rule out Anterior infarct (cited on or before 10-CLAUDE-2013)  Abnormal ECG  When compared with ECG of 10-CLAUDE-2013 14:37,  Vent. rate has decreased BY  23 BPM  Questionable change in initial forces of Anterolateral leads  Questionable change in initial forces of Posterior leads  T wave amplitude has decreased in Anterolateral leads  QT has shortened     Echo:  Echocardiogram:   Transthoracic Echo (TTE) Complete 01/29/2025    PHYSICIAN INTERPRETATION:  Left Ventricle: Left ventricular ejection fraction is normal, calculated by Barajas's biplane at 66%. There are no regional left ventricular wall motion abnormalities. The left ventricular cavity size is normal. There is normal septal and normal posterior left ventricular wall thickness. Spectral Doppler shows a normal pattern of left ventricular diastolic filling.  Left Atrium: The left atrial size is mild to moderately dilated.  Right Ventricle: The right ventricle is mildly enlarged. There is normal right ventricular global systolic function.  Right Atrium: The right atrium is mildly dilated.  Aortic Valve: The aortic valve is trileaflet. There is mild aortic valve cusp calcification. The aortic valve dimensionless index is 0.68. There is no evidence of aortic valve regurgitation. The peak instantaneous gradient of the aortic valve is 11 mmHg. The mean gradient of the aortic valve is 5 mmHg.  Mitral Valve: The mitral valve is mildly thickened. There is mild to moderate mitral annular calcification. There is no evidence of mitral valve regurgitation.  Tricuspid Valve: The tricuspid valve is structurally normal. There is mild tricuspid regurgitation.  Pulmonic Valve: The pulmonic valve is structurally normal. There is trace to mild pulmonic valve regurgitation.  Pericardium: There is no pericardial effusion noted.  Aorta: The aortic root is normal.  Pulmonary Artery: The  tricuspid regurgitant velocity is 2.67 m/s, and with an estimated right atrial pressure of 3 mmHg, the estimated pulmonary artery pressure is borderline elevated with the RVSP at 31.5 mmHg.  Systemic Veins: The inferior vena cava appears mildly dilated, with IVC inspiratory collapse greater than 50%.  In comparison to the previous echocardiogram(s): There are no prior studies on this patient for comparison purposes.      CONCLUSIONS:  1. Left ventricular ejection fraction is normal, calculated by Barajas's biplane at 66%.  2. The left atrial size is mild to moderately dilated.  3. The inferior vena cava appears mildly dilated, with IVC inspiratory collapse greater than 50%.  4. The patient was bradycardic with heart rates in the 30s throughout most of the exam.      Ejection Fractions:  LV EF   Date/Time Value Ref Range Status   01/29/2025 01:26 PM 66 %      Cath:  Coronary Angiography: No results found for this or any previous visit from the past 1800 days.    Right Heart Cath: No results found for this or any previous visit from the past 1800 days.    Stress Test:  Nuclear:No results found for this or any previous visit from the past 1800 days.    Metabolic Stress: No results found for this or any previous visit from the past 1800 days.    Cardiac Imaging:  Cardiac Scoring: No results found for this or any previous visit from the past 1800 days.    Cardiac MRI: No results found for this or any previous visit from the past 1800 days.         Assessment/Plan  Assessment/Plan   Assessment & Plan  Heart block    Symptomatic bradycardia    sinus node dysfunction  -PPM placement with Dr. Garcia on 1/30/25  -prophylaxis antibiotic prior to implant  -CXR tomorrow to r/o pneumothorax and ensure lead placement intact  -device nurse to see tomorrow morning for device interrogation  -plan to dc home on prophylactic antibiotics         NP discussed with Dr. Garcia regarding plan of care/ discharge plan      I spent 30 minutes in  the professional and overall care of this patient.      Christo Chun, APRN-CNP, DNP

## 2025-01-30 NOTE — PROGRESS NOTES
Subjective Data:  HR 30s  Still in ER   PPM scheduled today      Objective Data:  Last Recorded Vitals:  Vitals:    01/30/25 0400 01/30/25 0500 01/30/25 0600 01/30/25 0700   BP: 149/79 142/76 143/74 137/78   BP Location: Right arm Right arm Right arm Right arm   Patient Position: Lying Lying Lying Lying   Pulse: (!) 33 (!) 32 (!) 29 (!) 32   Resp: 20 (!) 22 (!) 24 (!) 21   Temp:       TempSrc:       SpO2: 97% 96% 94% 94%   Weight:       Height:           Last Labs:  CBC - 1/30/2025:  4:40 AM  8.4 14.1 201    42.9      CMP - 1/30/2025:  4:40 AM  8.5 6.7 16 --- 1.0   _ 4.4 16 74      PTT - 1/29/2025: 10:26 AM  1.1   11.9 27     TROPHS   Date/Time Value Ref Range Status   01/29/2025 11:38 AM 13 0 - 20 ng/L Final   01/29/2025 10:26 AM 12 0 - 20 ng/L Final     BNP   Date/Time Value Ref Range Status   01/29/2025 10:26  0 - 99 pg/mL Final     HGBA1C   Date/Time Value Ref Range Status   01/04/2024 10:31 AM 5.6 see below % Final     LDLCALC   Date/Time Value Ref Range Status   01/04/2024 10:31 AM 81 <=99 mg/dL Final     Comment:                                 Near   Borderline      AGE      Desirable  Optimal    High     High     Very High     0-19 Y     0 - 109     ---    110-129   >/= 130     ----    20-24 Y     0 - 119     ---    120-159   >/= 160     ----      >24 Y     0 -  99   100-129  130-159   160-189     >/=190       VLDL   Date/Time Value Ref Range Status   01/04/2024 10:31 AM 53 0 - 40 mg/dL Final   09/13/2022 11:50 AM 45 0 - 40 mg/dL Final   09/15/2021 09:21 AM 28 0 - 40 mg/dL Final   11/23/2020 08:32 AM 29 0 - 40 mg/dL Final      Last I/O:  No intake/output data recorded.    Past Cardiology Tests (Last 3 Years):  EKG:  ECG 12 lead 01/29/2025 (Preliminary)      ECG 12 lead (Clinic Performed) 01/29/2025    Echo:  Transthoracic Echo (TTE) Complete 01/29/2025    Ejection Fractions:  EF   Date/Time Value Ref Range Status   01/29/2025 01:26 PM 66 %      Cath:  No results found for this or any previous visit  "from the past 1095 days.    Stress Test:  No results found for this or any previous visit from the past 1095 days.    Cardiac Imaging:  No results found for this or any previous visit from the past 1095 days.      Inpatient Medications:          Physical Exam:  Constitutional: Well developed, awake/alert/oriented  x3, no distress, alert and cooperative   Head/Neck: Neck supple, normal JVD, negative HJR   Respiratory/Thorax: Normal breathing pattern. diminished otherwise CTA   Cardiovascular: Regular, rate and rhythm, no murmurs, 2+ equal pulses of the extremities, normal S 1and S 2   Extremities: No edema, +2 palpable RP bilaterally   Skin: Warm and dry         Assessment/Plan   Abhijeet Broderick is a 69 y.o. male with no significant history presents from his primary care's office with bradycardia.  Cardiology is now consulted for \"bradycardia\"        TTE 1/29/25  CONCLUSIONS:   1. Left ventricular ejection fraction is normal, calculated by Barajas's biplane at 66%.   2. The left atrial size is mild to moderately dilated.   3. The inferior vena cava appears mildly dilated, with IVC inspiratory collapse greater than 50%.   4. The patient was bradycardic with heart rates in the 30s throughout most of the exam.     #Sinus node dysfunction with symptomatic bradycardia  -HR 28-35, symptoms of dizziness and fatigue   #HTN urgency on arrival-190s- resolving         RECS:  -Case discussed with Dr Garcia of EP; NPO today for dual chamber PPM   -if more BP control needed can add on Losartan 25mg daily   -c/w telemetry   -avoid all AV michael blockers  - k >4, Mag>2, replace as needed         Thank you for allowing me to participate in their care.  Please feel free to call me with any further questions or concerns.    Bob Ribeiro MD, FACC, EMILY RAMIREZ  Division of Cardiovascular Medicine  System Director, Nuclear Cardiology   Medical Director, [facility] " \[facility] [place] [facility]\""   Clinical , [employer]  [email or web address]   Office:  [phone]          Both the YVES and I have had a face to face encounter with the patient today. I have examined the patient and edited the documented physical examination as necessary.  I personally reviewed the patient's vital signs, telemetry, recent labs, medications, orders, EKGs, and pertinent cardiac imaging/ echocardiography.  I have reviewed the YVES's encounter note, approve the VYES's documentation and have edited the note to reflect my diagnostic and therapeutic plan.

## 2025-01-30 NOTE — H&P
INTERNAL MEDICINE     HISTORY AND PHYSICAL      Chief Complaint:  Symptomatic bradycardia    History Of Present Illness  Abhijeet Broderick is a 69 y.o. male presenting with a 1 month history of dizziness and shortness of breath.  He has noted that his heart rate has been in the 30s and 40s.  He saw his primary care physician yesterday and was sent to the emergency room.  EKG in the ER revealed sinus bradycardia with sinus arrest and junctional escape rhythm.  Patient is scheduled for pacemaker placement today.  He denies chest pain or palpitations.  He has no prior history of cardiac disease.  He is active and swims regularly.  He was admitted for further evaluation and treatment.     Past Medical History  He has a past medical history of Other injury of unspecified body region, initial encounter (11/23/2020), Personal history of diseases of the blood and blood-forming organs and certain disorders involving the immune mechanism (01/10/2017), Personal history of diseases of the skin and subcutaneous tissue (01/06/2017), Personal history of diseases of the skin and subcutaneous tissue (02/18/2019), and Unspecified acute conjunctivitis, bilateral (02/18/2019).    Surgical History  He has a past surgical history that includes Orion tooth extraction; Wrist fracture surgery (Right); and Back surgery.     Social History  He reports that he has never smoked. He has never been exposed to tobacco smoke. He has never used smokeless tobacco. He reports that he does not drink alcohol and does not use drugs.    Family History  Family History   Problem Relation Name Age of Onset    Diabetes Mother  86    Diabetes Father      Hyperlipidemia Sister  69    Diabetes Sister          Allergies  Patient has no known allergies.    Home Medications:  Prior to Admission medications    Medication Sig Start Date End Date Taking? Authorizing Provider   tamsulosin (Flomax) 0.4 mg 24 hr capsule Take 1 capsule (0.4 mg) by mouth once  "daily.  Patient taking differently: Take 1 capsule (0.4 mg) by mouth once daily at bedtime. 9/30/24  Yes Komal Thompson,    halobetasol (UltraVATE) 0.05 % cream APPLY TO AFFECTED AREA ON BODY TWICE DAILY FOR 2 WEEKS AS NEEDED FOR FLARES. DO NOT USE ON FACE 1/23/25   Historical Provider, MD        Review of Systems   Constitutional: Negative.    HENT: Negative.     Eyes: Negative.    Respiratory: Negative.     Cardiovascular: Negative.    Gastrointestinal: Negative.    Genitourinary:  Positive for difficulty urinating and urgency.   Musculoskeletal: Negative.    Skin: Negative.    Neurological:  Positive for dizziness.        Last Recorded Vitals  Blood pressure 137/78, pulse (!) 32, temperature 36.3 °C (97.4 °F), temperature source Temporal, resp. rate (!) 21, height 1.803 m (5' 11\"), weight 98.9 kg (218 lb), SpO2 94%.    Physical Exam      Constitutional:       Appearance: Middle-aged, well-built, in no distress  HENT:      Head: Normocephalic and atraumatic.   Eyes:      Extraocular Movements: Extraocular movements intact.      Pupils: Pupils are equal, round, and reactive to light.   Cardiovascular:      Rate and Rhythm: Normal rate and regular rhythm.      Pulses: Normal pulses.      Heart sounds: Normal heart sounds.  Severe bradycardia noted.  Pulmonary:      Effort: Pulmonary effort is normal.      Breath sounds: Normal breath sounds.   Abdominal:      General: Abdomen is flat. Bowel sounds are normal.      Palpations: Abdomen is soft.   Musculoskeletal:         General: Normal range of motion.      Cervical back: Normal range of motion and neck supple.   Skin:     General: Skin is warm and dry.   Neurological:      General: No focal deficit present.      Mental Status: He is alert and oriented to person, place, and time. Mental status is at baseline.       Relevant Results    Results for orders placed or performed during the hospital encounter of 01/29/25 (from the past 24 hours)   Troponin, High " Sensitivity, 1 Hour   Result Value Ref Range    Troponin I, High Sensitivity 13 0 - 20 ng/L   Transthoracic Echo (TTE) Complete   Result Value Ref Range    AV pk niko 1.67 m/s    AV mn grad 5 mmHg    LVOT diam 2.03 cm    MV E/A ratio 0.96     LA vol index A/L 40.1 ml/m2    Tricuspid annular plane systolic excursion 2.7 cm    LV EF 66 %    RV free wall pk S' 17.00 cm/s    LVIDd 5.28 cm    RVSP 31.5 mmHg    Aortic Valve Area by Continuity of VTI 2.19 cm2    Aortic Valve Area by Continuity of Peak Velocity 2.32 cm2    AV pk grad 11 mmHg    LV A4C EF 63.7    CBC and Auto Differential   Result Value Ref Range    WBC 8.4 4.4 - 11.3 x10*3/uL    nRBC 0.0 0.0 - 0.0 /100 WBCs    RBC 4.89 4.50 - 5.90 x10*6/uL    Hemoglobin 14.1 13.5 - 17.5 g/dL    Hematocrit 42.9 41.0 - 52.0 %    MCV 88 80 - 100 fL    MCH 28.8 26.0 - 34.0 pg    MCHC 32.9 32.0 - 36.0 g/dL    RDW 13.2 11.5 - 14.5 %    Platelets 201 150 - 450 x10*3/uL    Neutrophils % 58.5 40.0 - 80.0 %    Immature Granulocytes %, Automated 0.4 0.0 - 0.9 %    Lymphocytes % 22.2 13.0 - 44.0 %    Monocytes % 12.3 2.0 - 10.0 %    Eosinophils % 5.7 0.0 - 6.0 %    Basophils % 0.9 0.0 - 2.0 %    Neutrophils Absolute 4.93 1.20 - 7.70 x10*3/uL    Immature Granulocytes Absolute, Automated 0.03 0.00 - 0.70 x10*3/uL    Lymphocytes Absolute 1.87 1.20 - 4.80 x10*3/uL    Monocytes Absolute 1.04 (H) 0.10 - 1.00 x10*3/uL    Eosinophils Absolute 0.48 0.00 - 0.70 x10*3/uL    Basophils Absolute 0.08 0.00 - 0.10 x10*3/uL   Basic Metabolic Panel   Result Value Ref Range    Glucose 106 (H) 74 - 99 mg/dL    Sodium 141 136 - 145 mmol/L    Potassium 4.2 3.5 - 5.3 mmol/L    Chloride 107 98 - 107 mmol/L    Bicarbonate 30 21 - 32 mmol/L    Anion Gap 8 (L) 10 - 20 mmol/L    Urea Nitrogen 20 6 - 23 mg/dL    Creatinine 1.10 0.50 - 1.30 mg/dL    eGFR 74 >60 mL/min/1.73m*2    Calcium 8.5 (L) 8.6 - 10.3 mg/dL           Assessment & Plan  Heart block    Symptomatic bradycardia          Problem list:  Assessment &  Plan  Heart block    Symptomatic bradycardia      ASSESSMENT AND PLAN:    Symptomatic bradycardia -patient has been seen by EP cardiology and is scheduled for pacemaker placement today.  BPH -continue with Flomax.  Dizziness - secondary to the above, monitor response to pacemaker placement.      Jose L Cisneros MD  01/30/2511:18 AM

## 2025-01-31 ENCOUNTER — APPOINTMENT (OUTPATIENT)
Dept: CARDIOLOGY | Facility: HOSPITAL | Age: 70
End: 2025-01-31
Payer: COMMERCIAL

## 2025-01-31 ENCOUNTER — APPOINTMENT (OUTPATIENT)
Dept: RADIOLOGY | Facility: HOSPITAL | Age: 70
End: 2025-01-31
Payer: COMMERCIAL

## 2025-01-31 ENCOUNTER — PHARMACY VISIT (OUTPATIENT)
Dept: PHARMACY | Facility: CLINIC | Age: 70
End: 2025-01-31
Payer: COMMERCIAL

## 2025-01-31 VITALS
SYSTOLIC BLOOD PRESSURE: 161 MMHG | RESPIRATION RATE: 16 BRPM | BODY MASS INDEX: 30.52 KG/M2 | HEIGHT: 71 IN | WEIGHT: 218 LBS | DIASTOLIC BLOOD PRESSURE: 89 MMHG | HEART RATE: 64 BPM | OXYGEN SATURATION: 94 % | TEMPERATURE: 98.4 F

## 2025-01-31 LAB
ANION GAP SERPL CALC-SCNC: 10 MMOL/L (ref 10–20)
BASOPHILS # BLD AUTO: 0.07 X10*3/UL (ref 0–0.1)
BASOPHILS NFR BLD AUTO: 0.7 %
BUN SERPL-MCNC: 18 MG/DL (ref 6–23)
CALCIUM SERPL-MCNC: 8.6 MG/DL (ref 8.6–10.3)
CHLORIDE SERPL-SCNC: 107 MMOL/L (ref 98–107)
CO2 SERPL-SCNC: 28 MMOL/L (ref 21–32)
CREAT SERPL-MCNC: 0.88 MG/DL (ref 0.5–1.3)
EGFRCR SERPLBLD CKD-EPI 2021: >90 ML/MIN/1.73M*2
EOSINOPHIL # BLD AUTO: 0.37 X10*3/UL (ref 0–0.7)
EOSINOPHIL NFR BLD AUTO: 3.7 %
ERYTHROCYTE [DISTWIDTH] IN BLOOD BY AUTOMATED COUNT: 13.1 % (ref 11.5–14.5)
GLUCOSE SERPL-MCNC: 103 MG/DL (ref 74–99)
HCT VFR BLD AUTO: 45.5 % (ref 41–52)
HGB BLD-MCNC: 15.6 G/DL (ref 13.5–17.5)
IMM GRANULOCYTES # BLD AUTO: 0.03 X10*3/UL (ref 0–0.7)
IMM GRANULOCYTES NFR BLD AUTO: 0.3 % (ref 0–0.9)
LYMPHOCYTES # BLD AUTO: 1.22 X10*3/UL (ref 1.2–4.8)
LYMPHOCYTES NFR BLD AUTO: 12.2 %
MCH RBC QN AUTO: 29.3 PG (ref 26–34)
MCHC RBC AUTO-ENTMCNC: 34.3 G/DL (ref 32–36)
MCV RBC AUTO: 85 FL (ref 80–100)
MONOCYTES # BLD AUTO: 0.98 X10*3/UL (ref 0.1–1)
MONOCYTES NFR BLD AUTO: 9.8 %
NEUTROPHILS # BLD AUTO: 7.36 X10*3/UL (ref 1.2–7.7)
NEUTROPHILS NFR BLD AUTO: 73.3 %
NRBC BLD-RTO: 0 /100 WBCS (ref 0–0)
PLATELET # BLD AUTO: 191 X10*3/UL (ref 150–450)
POTASSIUM SERPL-SCNC: 4.3 MMOL/L (ref 3.5–5.3)
RBC # BLD AUTO: 5.33 X10*6/UL (ref 4.5–5.9)
SODIUM SERPL-SCNC: 141 MMOL/L (ref 136–145)
WBC # BLD AUTO: 10 X10*3/UL (ref 4.4–11.3)

## 2025-01-31 PROCEDURE — 71046 X-RAY EXAM CHEST 2 VIEWS: CPT

## 2025-01-31 PROCEDURE — 71046 X-RAY EXAM CHEST 2 VIEWS: CPT | Performed by: RADIOLOGY

## 2025-01-31 PROCEDURE — 2500000001 HC RX 250 WO HCPCS SELF ADMINISTERED DRUGS (ALT 637 FOR MEDICARE OP): Performed by: NURSE PRACTITIONER

## 2025-01-31 PROCEDURE — 85025 COMPLETE CBC W/AUTO DIFF WBC: CPT | Performed by: NURSE PRACTITIONER

## 2025-01-31 PROCEDURE — 36415 COLL VENOUS BLD VENIPUNCTURE: CPT | Performed by: NURSE PRACTITIONER

## 2025-01-31 PROCEDURE — 82565 ASSAY OF CREATININE: CPT | Performed by: NURSE PRACTITIONER

## 2025-01-31 PROCEDURE — RXMED WILLOW AMBULATORY MEDICATION CHARGE

## 2025-01-31 RX ORDER — LOSARTAN POTASSIUM 25 MG/1
25 TABLET ORAL DAILY
Status: DISCONTINUED | OUTPATIENT
Start: 2025-01-31 | End: 2025-01-31 | Stop reason: HOSPADM

## 2025-01-31 RX ORDER — LOSARTAN POTASSIUM 25 MG/1
25 TABLET ORAL DAILY
Qty: 30 TABLET | Refills: 0 | Status: SHIPPED | OUTPATIENT
Start: 2025-01-31 | End: 2025-03-02

## 2025-01-31 RX ADMIN — LOSARTAN POTASSIUM 25 MG: 25 TABLET, FILM COATED ORAL at 10:16

## 2025-01-31 RX ADMIN — DOXYCYCLINE HYCLATE 100 MG: 100 TABLET, COATED ORAL at 10:16

## 2025-01-31 ASSESSMENT — PAIN - FUNCTIONAL ASSESSMENT
PAIN_FUNCTIONAL_ASSESSMENT: 0-10

## 2025-01-31 ASSESSMENT — PAIN SCALES - GENERAL
PAINLEVEL_OUTOF10: 0 - NO PAIN
PAINLEVEL_OUTOF10: 1
PAINLEVEL_OUTOF10: 0 - NO PAIN
PAINLEVEL_OUTOF10: 0 - NO PAIN

## 2025-01-31 NOTE — PROGRESS NOTES
Subjective Data:  S/p PPM yesterday  BP on higher side post   Chest xray this am unremarkable    Tele show pacing 60     Objective Data:  Last Recorded Vitals:  Vitals:    01/30/25 2000 01/31/25 0000 01/31/25 0400 01/31/25 0842   BP: 143/76 153/85 (!) 165/95 146/79   BP Location: Right arm Right arm Right arm Right arm   Patient Position: Lying Lying Lying Sitting   Pulse:    61   Resp:    16   Temp: 36.5 °C (97.7 °F) 35.9 °C (96.6 °F) 36.7 °C (98.1 °F) 36.6 °C (97.9 °F)   TempSrc: Temporal Temporal Temporal Temporal   SpO2: 92% 95% 94% 94%   Weight:       Height:           Last Labs:  CBC - 1/31/2025:  6:23 AM  10.0 15.6 191    45.5      CMP - 1/31/2025:  6:23 AM  8.6 6.7 16 --- 1.0   _ 4.4 16 74      PTT - 1/29/2025: 10:26 AM  1.1   11.9 27     TROPHS   Date/Time Value Ref Range Status   01/29/2025 11:38 AM 13 0 - 20 ng/L Final   01/29/2025 10:26 AM 12 0 - 20 ng/L Final     BNP   Date/Time Value Ref Range Status   01/29/2025 10:26  0 - 99 pg/mL Final     HGBA1C   Date/Time Value Ref Range Status   01/04/2024 10:31 AM 5.6 see below % Final     LDLCALC   Date/Time Value Ref Range Status   01/04/2024 10:31 AM 81 <=99 mg/dL Final     Comment:                                 Near   Borderline      AGE      Desirable  Optimal    High     High     Very High     0-19 Y     0 - 109     ---    110-129   >/= 130     ----    20-24 Y     0 - 119     ---    120-159   >/= 160     ----      >24 Y     0 -  99   100-129  130-159   160-189     >/=190       VLDL   Date/Time Value Ref Range Status   01/04/2024 10:31 AM 53 0 - 40 mg/dL Final   09/13/2022 11:50 AM 45 0 - 40 mg/dL Final   09/15/2021 09:21 AM 28 0 - 40 mg/dL Final   11/23/2020 08:32 AM 29 0 - 40 mg/dL Final      Last I/O:  I/O last 3 completed shifts:  In: - (0 mL/kg)   Out: 10 (0.1 mL/kg) [Blood:10]  Weight: 98.9 kg     Past Cardiology Tests (Last 3 Years):  EKG:  ECG 12 lead 01/29/2025 (Preliminary)      ECG 12 lead (Clinic Performed)  "01/29/2025    Echo:  Transthoracic Echo (TTE) Complete 01/29/2025    Ejection Fractions:  EF   Date/Time Value Ref Range Status   01/29/2025 01:26 PM 66 %      Cath:  No results found for this or any previous visit from the past 1095 days.    Stress Test:  No results found for this or any previous visit from the past 1095 days.    Cardiac Imaging:  No results found for this or any previous visit from the past 1095 days.      Inpatient Medications:  Scheduled medications   Medication Dose Route Frequency    doxycylcine  100 mg oral q12h ISAURA    losartan  25 mg oral Daily    tamsulosin  0.4 mg oral Daily     PRN medications   Medication    acetaminophen    Or    acetaminophen    Or    acetaminophen    hydrALAZINE    melatonin    ondansetron    Or    ondansetron    traMADol     Continuous Medications   Medication Dose Last Rate       Physical Exam:  Constitutional: Well developed, awake/alert/oriented  x3, no distress, alert and cooperative   Head/Neck: Neck supple, normal JVD, negative HJR   Respiratory/Thorax: Normal breathing pattern. diminished otherwise CTA   Cardiovascular: Regular, rate and rhythm, no murmurs, 2+ equal pulses of the extremities, normal S 1and S 2   Extremities: No edema, +2 palpable RP bilaterally   Skin: Warm and dry         Assessment/Plan   Abhijeet Broderick is a 69 y.o. male with no significant history presents from his primary care's office with bradycardia.  Cardiology is now consulted for \"bradycardia\"        TTE 1/29/25  CONCLUSIONS:   1. Left ventricular ejection fraction is normal, calculated by Barajas's biplane at 66%.   2. The left atrial size is mild to moderately dilated.   3. The inferior vena cava appears mildly dilated, with IVC inspiratory collapse greater than 50%.   4. The patient was bradycardic with heart rates in the 30s throughout most of the exam.     #Sinus node dysfunction with symptomatic bradycardia  -HR 28-35, symptoms of dizziness and fatigue   #HTN urgency on " arrival-190s- resolving         RECS:  -s/p PPM 1/30/25  -Losartan 25mg daily added  -c/w telemetry   -avoid all AV michael blockers  - k >4, Mag>2, replace as needed   -will need follow up with cards -> appt placed with [facility]  -no cardiac barriers to discharge

## 2025-01-31 NOTE — PROGRESS NOTES
INTERNAL MEDICINE PROGRESS NOTE      HPI:    Doing well.  Underwent pacemaker placement successfully yesterday.  Denies dizziness.  Blood pressure variable.    Vital signs in last 24 hours:  Temp:  [35.9 °C (96.6 °F)-37 °C (98.6 °F)] 36.6 °C (97.9 °F)  Heart Rate:  [60-61] 61  Resp:  [16-19] 16  BP: (143-189)/() 146/79    Physical Examination:  Physical Exam      Constitutional:       Appearance: Middle-aged, well-built, in no distress  HENT:      Head: Normocephalic and atraumatic.   Eyes:      Extraocular Movements: Extraocular movements intact.      Pupils: Pupils are equal, round, and reactive to light.   Cardiovascular:      Rate and Rhythm: Normal rate and regular rhythm.      Pulses: Normal pulses.      Heart sounds: Normal heart sounds.  Heart rate normal.  Pulmonary:      Effort: Pulmonary effort is normal.      Breath sounds: Normal breath sounds.   Abdominal:      General: Abdomen is flat. Bowel sounds are normal.      Palpations: Abdomen is soft.   Musculoskeletal:         General: Normal range of motion.      Cervical back: Normal range of motion and neck supple.   Skin:     General: Skin is warm and dry.  Pacemaker incision healing nicely.  Neurological:      General: No focal deficit present.      Mental Status: He is alert and oriented to person, place, and time. Mental status is at baseline.      Medications:    Current Facility-Administered Medications:     acetaminophen (Tylenol) tablet 650 mg, 650 mg, oral, q4h PRN **OR** acetaminophen (Tylenol) oral liquid 650 mg, 650 mg, oral, q4h PRN **OR** acetaminophen (Tylenol) suppository 650 mg, 650 mg, rectal, q4h PRN, JACKIE Singleton, DNP    doxycycline (Vibra-Tabs) tablet 100 mg, 100 mg, oral, q12h [facility], JACKIE Singleton, DNP, 100 mg at 01/30/25 2135    hydrALAZINE (Apresoline) injection 10 mg, 10 mg, intravenous, q4h PRN, Jose L Cisneros MD    losartan (Cozaar) tablet 25 mg, 25 mg, oral, Daily, Anjelica TELLEZ  JACKIE Choe    melatonin tablet 3 mg, 3 mg, oral, Nightly PRN, JACKIE Singleton DNP    ondansetron (Zofran) tablet 4 mg, 4 mg, oral, q8h PRN **OR** ondansetron (Zofran) injection 4 mg, 4 mg, intravenous, q8h PRN, JACKIE Singleton DNP    tamsulosin (Flomax) 24 hr capsule 0.4 mg, 0.4 mg, oral, Daily, JACKIE Singleton DNP, 0.4 mg at 01/30/25 1751    traMADol (Ultram) tablet 50 mg, 50 mg, oral, q6h PRN, JACKIE Singleton DNP    Laboratory Findings:  Lab Results   Component Value Date    WBC 10.0 01/31/2025    HGB 15.6 01/31/2025    HCT 45.5 01/31/2025    MCV 85 01/31/2025     01/31/2025     Lab Results   Component Value Date    INR 1.1 01/29/2025    PROTIME 11.9 01/29/2025     Lab Results   Component Value Date    GLUCOSE 103 (H) 01/31/2025    CALCIUM 8.6 01/31/2025     01/31/2025    K 4.3 01/31/2025    CO2 28 01/31/2025     01/31/2025    BUN 18 01/31/2025    CREATININE 0.88 01/31/2025       Assessment and Plan:    Symptomatic bradycardia -treated successfully with pacemaker.  BPH -continue with Flomax.  Hypertension -started on losartan, monitor vital signs.    Will plan to discharge home today.       Jose L Cisneros MD  01/31/25  9:28 AM

## 2025-01-31 NOTE — CARE PLAN
The patient's goals for the shift include      The clinical goals for the shift include HR >60 by end of shift

## 2025-02-01 NOTE — DISCHARGE SUMMARY
Discharge Diagnosis  Heart block    Issues Requiring Follow-Up      Discharge Meds     Medication List      START taking these medications     acetaminophen 325 mg tablet; Commonly known as: Tylenol; Take 2 tablets   (650 mg) by mouth every 4 hours if needed for mild pain (1 - 3) or   moderate pain (4 - 6).   doxycycline 100 mg capsule; Commonly known as: Vibramycin; Take 1   capsule (100 mg) by mouth every 12 hours for 14 doses. Take with a full   glass of water and do not lie down for at least 30 minutes after.   losartan 25 mg tablet; Commonly known as: Cozaar; Take 1 tablet (25 mg)   by mouth once daily.     CHANGE how you take these medications     tamsulosin 0.4 mg 24 hr capsule; Commonly known as: Flomax; Take 1   capsule (0.4 mg) by mouth once daily.; What changed: when to take this     CONTINUE taking these medications     halobetasol 0.05 % cream; Commonly known as: UltraVATE       Test Results Pending At Discharge  Pending Labs       No current pending labs.            Hospital Course       Abhijeet Broderick is a 69 y.o. male presenting with a 1 month history of dizziness and shortness of breath.  He has noted that his heart rate has been in the 30s and 40s.  He saw his primary care physician yesterday and was sent to the emergency room.  EKG in the ER revealed sinus bradycardia with sinus arrest and junctional escape rhythm.  Patient is scheduled for pacemaker placement today.  He denies chest pain or palpitations.  He has no prior history of cardiac disease.  He is active and swims regularly.  He was admitted for further evaluation and treatment.     The patient had a pacer placed, was followed closely by cardio. He was stable for [facility] with close outpatient follow up.     Pertinent Physical Exam At Time of Discharge      Outpatient Follow-Up  Future Appointments   Date Time Provider Department Center   2/3/2025  8:00 AM Komal Thompson DO [id number]   2/24/2025  9:00 AM [id number]   3/10/2025  9:45 AM Bob COOPER MD [address]     Time and care for discharge management > 30 minutes.     Jose L Cisneros MD

## 2025-02-03 ENCOUNTER — APPOINTMENT (OUTPATIENT)
Dept: PRIMARY CARE | Facility: CLINIC | Age: 70
End: 2025-02-03
Payer: COMMERCIAL

## 2025-02-03 ENCOUNTER — DOCUMENTATION (OUTPATIENT)
Dept: PRIMARY CARE | Facility: CLINIC | Age: 70
End: 2025-02-03

## 2025-02-03 ENCOUNTER — HOSPITAL ENCOUNTER (OUTPATIENT)
Dept: CARDIOLOGY | Facility: CLINIC | Age: 70
Discharge: HOME | End: 2025-02-03
Payer: COMMERCIAL

## 2025-02-03 VITALS
OXYGEN SATURATION: 95 % | WEIGHT: 212.8 LBS | TEMPERATURE: 97.5 F | BODY MASS INDEX: 29.68 KG/M2 | SYSTOLIC BLOOD PRESSURE: 142 MMHG | DIASTOLIC BLOOD PRESSURE: 86 MMHG | HEART RATE: 80 BPM

## 2025-02-03 DIAGNOSIS — I45.9 HEART BLOCK: ICD-10-CM

## 2025-02-03 DIAGNOSIS — Z95.0 PACEMAKER: Primary | ICD-10-CM

## 2025-02-03 PROCEDURE — 1111F DSCHRG MED/CURRENT MED MERGE: CPT | Performed by: FAMILY MEDICINE

## 2025-02-03 PROCEDURE — 1159F MED LIST DOCD IN RCRD: CPT | Performed by: FAMILY MEDICINE

## 2025-02-03 PROCEDURE — 99496 TRANSJ CARE MGMT HIGH F2F 7D: CPT | Performed by: FAMILY MEDICINE

## 2025-02-03 PROCEDURE — 1036F TOBACCO NON-USER: CPT | Performed by: FAMILY MEDICINE

## 2025-02-03 NOTE — PROGRESS NOTES
Discharge Facility: [facility]   Discharge Diagnosis: Heart Block   Admission Date: 1/29/25  Discharge Date: 1/31/25     PCP Appointment Date: 2/3/25 @ 0800  Specialist Appointment Date: 2/24/25 Device check clinic   Hospital Encounter and Summary Linked: ED to Hosp-Admission (Discharged) with Jose L Cisneros MD; Anders Doll MD (01/29/2025)   See discharge assessment below for further details    Patient has a 2 day appointment, and has arrived for appointment. Will contact after PCP appt with in 3-5 days to check in with patient.

## 2025-02-05 ENCOUNTER — PATIENT OUTREACH (OUTPATIENT)
Dept: PRIMARY CARE | Facility: CLINIC | Age: 70
End: 2025-02-05
Payer: COMMERCIAL

## 2025-02-10 ENCOUNTER — HOSPITAL ENCOUNTER (OUTPATIENT)
Dept: CARDIOLOGY | Facility: CLINIC | Age: 70
Discharge: HOME | End: 2025-02-10
Payer: COMMERCIAL

## 2025-02-10 DIAGNOSIS — I45.9 HEART BLOCK: ICD-10-CM

## 2025-02-23 LAB
ATRIAL RATE: 28 BPM
P AXIS: 28 DEGREES
P OFFSET: 181 MS
P ONSET: 126 MS
PR INTERVAL: 186 MS
Q ONSET: 219 MS
QRS COUNT: 5 BEATS
QRS DURATION: 80 MS
QT INTERVAL: 468 MS
QTC CALCULATION(BAZETT): 319 MS
QTC FREDERICIA: 363 MS
R AXIS: -16 DEGREES
T AXIS: 4 DEGREES
T OFFSET: 453 MS
VENTRICULAR RATE: 28 BPM

## 2025-02-24 ENCOUNTER — APPOINTMENT (OUTPATIENT)
Dept: CARDIOLOGY | Facility: CLINIC | Age: 70
End: 2025-02-24
Payer: COMMERCIAL

## 2025-02-27 DIAGNOSIS — R00.1 BRADYCARDIA, UNSPECIFIED: ICD-10-CM

## 2025-02-27 RX ORDER — LOSARTAN POTASSIUM 25 MG/1
25 TABLET ORAL DAILY
Qty: 90 TABLET | Refills: 0 | Status: SHIPPED | OUTPATIENT
Start: 2025-02-27 | End: 2025-05-28

## 2025-02-27 RX ORDER — LOSARTAN POTASSIUM 25 MG/1
25 TABLET ORAL DAILY
Qty: 30 TABLET | Refills: 0 | Status: SHIPPED | OUTPATIENT
Start: 2025-02-27 | End: 2025-03-29

## 2025-02-27 NOTE — TELEPHONE ENCOUNTER
[facility] per Dr. Ness to RF Losartan for 1 month. Patient will follow with Shawn for device clinic.

## 2025-03-03 ENCOUNTER — PATIENT OUTREACH (OUTPATIENT)
Dept: PRIMARY CARE | Facility: CLINIC | Age: 70
End: 2025-03-03
Payer: COMMERCIAL

## 2025-03-03 ENCOUNTER — HOSPITAL ENCOUNTER (OUTPATIENT)
Dept: CARDIOLOGY | Facility: CLINIC | Age: 70
Discharge: HOME | End: 2025-03-03
Payer: COMMERCIAL

## 2025-03-03 DIAGNOSIS — I45.9 HEART BLOCK: ICD-10-CM

## 2025-03-03 PROCEDURE — 93280 PM DEVICE PROGR EVAL DUAL: CPT

## 2025-03-10 ENCOUNTER — OFFICE VISIT (OUTPATIENT)
Dept: CARDIOLOGY | Facility: CLINIC | Age: 70
End: 2025-03-10
Payer: COMMERCIAL

## 2025-03-10 VITALS
HEIGHT: 71 IN | HEART RATE: 89 BPM | WEIGHT: 216 LBS | DIASTOLIC BLOOD PRESSURE: 72 MMHG | SYSTOLIC BLOOD PRESSURE: 138 MMHG | BODY MASS INDEX: 30.24 KG/M2 | OXYGEN SATURATION: 95 %

## 2025-03-10 DIAGNOSIS — I45.9 HEART BLOCK: Primary | ICD-10-CM

## 2025-03-10 DIAGNOSIS — I11.9 BENIGN HYPERTENSIVE HEART DISEASE WITHOUT CONGESTIVE HEART FAILURE: ICD-10-CM

## 2025-03-10 DIAGNOSIS — R00.1 SYMPTOMATIC BRADYCARDIA: ICD-10-CM

## 2025-03-10 DIAGNOSIS — I51.7 LEFT ATRIAL ENLARGEMENT: ICD-10-CM

## 2025-03-10 DIAGNOSIS — Z95.0 PACEMAKER: ICD-10-CM

## 2025-03-10 PROCEDURE — G2211 COMPLEX E/M VISIT ADD ON: HCPCS | Performed by: STUDENT IN AN ORGANIZED HEALTH CARE EDUCATION/TRAINING PROGRAM

## 2025-03-10 PROCEDURE — 99215 OFFICE O/P EST HI 40 MIN: CPT | Performed by: STUDENT IN AN ORGANIZED HEALTH CARE EDUCATION/TRAINING PROGRAM

## 2025-03-10 PROCEDURE — 1159F MED LIST DOCD IN RCRD: CPT | Performed by: STUDENT IN AN ORGANIZED HEALTH CARE EDUCATION/TRAINING PROGRAM

## 2025-03-10 PROCEDURE — 1036F TOBACCO NON-USER: CPT | Performed by: STUDENT IN AN ORGANIZED HEALTH CARE EDUCATION/TRAINING PROGRAM

## 2025-03-10 PROCEDURE — 3008F BODY MASS INDEX DOCD: CPT | Performed by: STUDENT IN AN ORGANIZED HEALTH CARE EDUCATION/TRAINING PROGRAM

## 2025-03-10 NOTE — PROGRESS NOTES
Referred by Dr. Thompson for Hospital Follow-up     HPI:    Abhijeet Broderick is a 70 y.o. male with pertinent history of sick sinus syndrome with symptomatic bradycardia status post [place] dual-chamber pacemaker with Dr. Garcia 1/29/2025, preserved LVEF of 66% with mild to moderate left atrial enlargement on echocardiogram performed 1/29/2025 presents to cardiology clinic to establish care after recent hospitalization for pacemaker.     His recent hospital course was reviewed and discussed.  He is doing relatively well since discharge.  Pacemaker is healing well.  We discussed natural history of sick sinus syndrome.  No exacerbating or relieving factors.  Patient denies chest pain and angina.  Pt denies orthopnea, and paroxysmal nocturnal dyspnea.  Pt denies worsening lower extremity edema.  Pt denies palpitations or syncope.  No recent falls.  No fever or chills.  No cough.  No change in bowel or bladder habits.  No sick contacts.  No recent travel.    12 point review of systems including (Constitutional, Eyes, ENMT, Respiratory, Cardiac, Gastrointestinal, Neurological, Psychiatric, and Hematologic) was performed and is otherwise negative.    Past medical history reviewed:   has a past medical history of Other injury of unspecified body region, initial encounter (11/23/2020), Personal history of diseases of the blood and blood-forming organs and certain disorders involving the immune mechanism (01/10/2017), Personal history of diseases of the skin and subcutaneous tissue (01/06/2017), Personal history of diseases of the skin and subcutaneous tissue (02/18/2019), and Unspecified acute conjunctivitis, bilateral (02/18/2019).    Past surgical history reviewed:   has a past surgical history that includes [place] tooth extraction; Wrist fracture surgery (Right); Back surgery; and Cardiac electrophysiology procedure (N/A, 1/30/2025).    Social history reviewed:   reports that he has never smoked. He has never been exposed  to tobacco smoke. He has never used smokeless tobacco. He reports that he does not drink alcohol and does not use drugs.     Family history reviewed:    Family History   Problem Relation Name Age of Onset    Diabetes Mother  86    Diabetes Father      Hyperlipidemia Sister  69    Diabetes Sister         Allergies reviewed: Patient has no known allergies.     Medications reviewed:   Current Outpatient Medications   Medication Instructions    acetaminophen (TYLENOL) 650 mg, oral, Every 4 hours PRN    halobetasol (UltraVATE) 0.05 % cream APPLY TO AFFECTED AREA ON BODY TWICE DAILY FOR 2 WEEKS AS NEEDED FOR FLARES. DO NOT USE ON FACE    losartan (COZAAR) 25 mg, oral, Daily    losartan (COZAAR) 25 mg, oral, Daily    tamsulosin (FLOMAX) 0.4 mg, oral, Daily        Vitals reviewed: Visit Vitals  /72   Pulse 89       Physical Exam:   General:  Patient is awake, alert, and oriented.  Patient is in no acute distress.  HEENT:  Pupils equal and reactive.  Normocephalic.  Moist mucosa.    Neck:  No thyromegaly.  Normal Jugular Venous Pressure.  Cardiovascular:  Regular rate and rhythm.  Normal S1 and S2.  1/6 JOAN.  Pacemaker in place healed.  Pulmonary:  Clear to auscultation bilaterally.  Abdomen:  Soft. Non-tender.   Non-distended.  Positive bowel sounds.  Lower Extremities:  2+ pedal pulses. No LE edema.  Neurologic:  Cranial nerves intact.  No focal deficit.   Skin: Skin warm and dry, normal skin turgor.   Psychiatric: Normal affect.    Last Labs:  CBC -      Lab Results   Component Value Date    WBC 10.0 01/31/2025    HGB 15.6 01/31/2025    HCT 45.5 01/31/2025     01/31/2025        CMP-  Lab Results   Component Value Date    GLUCOSE 103 (H) 01/31/2025     01/31/2025    K 4.3 01/31/2025     01/31/2025    CO2 28 01/31/2025    ANIONGAP 10 01/31/2025    BUN 18 01/31/2025    CREATININE 0.88 01/31/2025    EGFR >90 01/31/2025    CALCIUM 8.6 01/31/2025    PROT 6.7 01/29/2025    ALBUMIN 4.4 01/29/2025    AST  16 01/29/2025    ALT 16 01/29/2025    ALKPHOS 74 01/29/2025    BILITOT 1.0 01/29/2025        LIPIDS-  Lab Results   Component Value Date    CHOL 180 01/04/2024    TRIG 263 (H) 01/04/2024    HDL 46.4 01/04/2024    CHHDL 3.9 01/04/2024    VLDL 53 (H) 01/04/2024        OTHERS-  Lab Results   Component Value Date    HGBA1C 5.6 01/04/2024     (H) 01/29/2025        I personally reviewed the patient's recent vitals, labs, medications, orders, EKGs, pertinent cardiac imaging/ echocardiography and device reports.    Assessment and Plan:    Abhijeet Broderick is a 70 y.o. male with pertinent history of sick sinus syndrome with symptomatic bradycardia status post [place] dual-chamber pacemaker with Dr. aGrcia 1/29/2025, preserved LVEF of 66% with mild to moderate left atrial enlargement on echocardiogram performed 1/29/2025 presents to cardiology clinic to establish care after recent hospitalization for pacemaker.  His recent hospital course was reviewed and discussed.  He is doing relatively well since discharge.  Pacemaker is healing well.  We discussed natural history of sick sinus syndrome.  His home blood pressures are well-controlled.    Please continue current cardiac medications including losartan 25 mg daily.    Please followup with me in Cardiology clinic within the next 6 to 7 months..  Please return to clinic sooner or seek emergent care if your symptoms reoccur or worsen.    Thank you for allowing me to participate in their care.  Please feel free to call me with any further questions or concerns.        Bob Ribeiro MD, FACC, EMILY RAMIREZ  Division of Cardiovascular Medicine  System Director, Nuclear Cardiology   Medical Director, [facility] [place], [facility]   Clinical , [employer]  [email or web address]   Office:  [phone]

## 2025-03-10 NOTE — PATIENT INSTRUCTIONS
Please continue current cardiac medications including losartan 25 mg daily.    Please followup with me in Cardiology clinic within the next 6 to 7 months..  Please return to clinic sooner or seek emergent care if your symptoms reoccur or worsen.

## 2025-05-05 ENCOUNTER — PATIENT OUTREACH (OUTPATIENT)
Dept: PRIMARY CARE | Facility: CLINIC | Age: 70
End: 2025-05-05
Payer: COMMERCIAL

## 2025-05-06 ENCOUNTER — APPOINTMENT (OUTPATIENT)
Dept: PRIMARY CARE | Facility: CLINIC | Age: 70
End: 2025-05-06
Payer: COMMERCIAL

## 2025-05-06 VITALS
TEMPERATURE: 97.3 F | BODY MASS INDEX: 29.9 KG/M2 | SYSTOLIC BLOOD PRESSURE: 130 MMHG | HEART RATE: 62 BPM | OXYGEN SATURATION: 98 % | DIASTOLIC BLOOD PRESSURE: 84 MMHG | WEIGHT: 214.4 LBS

## 2025-05-06 DIAGNOSIS — R35.1 NOCTURIA: ICD-10-CM

## 2025-05-06 DIAGNOSIS — R35.0 INCREASED URINARY FREQUENCY: ICD-10-CM

## 2025-05-06 DIAGNOSIS — E78.1 HYPERTRIGLYCERIDEMIA: Primary | ICD-10-CM

## 2025-05-06 DIAGNOSIS — I45.9 HEART BLOCK: ICD-10-CM

## 2025-05-06 DIAGNOSIS — R00.1 BRADYCARDIA, UNSPECIFIED: ICD-10-CM

## 2025-05-06 PROCEDURE — 1036F TOBACCO NON-USER: CPT | Performed by: FAMILY MEDICINE

## 2025-05-06 PROCEDURE — 1124F ACP DISCUSS-NO DSCNMKR DOCD: CPT | Performed by: FAMILY MEDICINE

## 2025-05-06 PROCEDURE — 99214 OFFICE O/P EST MOD 30 MIN: CPT | Performed by: FAMILY MEDICINE

## 2025-05-06 RX ORDER — TAMSULOSIN HYDROCHLORIDE 0.4 MG/1
0.4 CAPSULE ORAL DAILY
Qty: 90 CAPSULE | Refills: 1 | Status: SHIPPED | OUTPATIENT
Start: 2025-05-06

## 2025-05-06 RX ORDER — LOSARTAN POTASSIUM 25 MG/1
25 TABLET ORAL DAILY
Qty: 90 TABLET | Refills: 1 | Status: SHIPPED | OUTPATIENT
Start: 2025-05-06 | End: 2025-11-02

## 2025-05-06 ASSESSMENT — ENCOUNTER SYMPTOMS
OCCASIONAL FEELINGS OF UNSTEADINESS: 0
DEPRESSION: 0
LOSS OF SENSATION IN FEET: 0

## 2025-05-06 ASSESSMENT — PATIENT HEALTH QUESTIONNAIRE - PHQ9
SUM OF ALL RESPONSES TO PHQ9 QUESTIONS 1 AND 2: 0
1. LITTLE INTEREST OR PLEASURE IN DOING THINGS: NOT AT ALL
2. FEELING DOWN, DEPRESSED OR HOPELESS: NOT AT ALL

## 2025-05-06 NOTE — PROGRESS NOTES
Assessment/Plan       Problem List Items Addressed This Visit       Hypertriglyceridemia - Primary    Heart block    Overview   Diagnosed 1/31/25  Sinus Arrest w/ junctional escape rhythm   Pacemaker placed 2/25  Following w/ cardiology          Other Visit Diagnoses         Bradycardia, unspecified        Relevant Medications    losartan (Cozaar) 25 mg tablet      Increased urinary frequency        Relevant Medications    tamsulosin (Flomax) 0.4 mg 24 hr capsule      Nocturia        Relevant Medications    tamsulosin (Flomax) 0.4 mg 24 hr capsule            Abhijeet Broderick is a 70 y.o. male with pertinent history of sick sinus syndrome with symptomatic bradycardia status post [place] dual-chamber pacemaker with Dr. Garcia 1/29/2025, preserved LVEF of 66% with mild to moderate left atrial enlargement on echocardiogram performed 1/29/2025   I reviewed lab work that was completed by the cardiology team including BMP which was within normal limits with mildly high glucose at 103 his CBC was also within normal limits.  He is up to date on lab work.  Blood pressure well-controlled.  Will continue on his antihypertensives including  Losartan 25 mg.  And He Is Continuing on once daily.  For BPH.      Subjective   Patient ID: Abhijeet Broderick is a 70 y.o. male who presents for 3 Month Follow Up.    Doing well overall   Swimming daily without issue   Does notice it takes a moment for heart rate to catch up when he changes positions (sitting -> standing).     Giong to [place] at end of this month; snorkelng on [facility].     He is interested in meeting w/ dr. Newman to have more definitive procedure done to help w/ BPH.   He has never had issues w /anesthesia or intubation in past.     Objective   /84 (BP Location: Left arm, Patient Position: Sitting, BP Cuff Size: Adult)   Pulse 62   Temp 36.3 °C (97.3 °F) (Skin)   Wt 97.3 kg (214 lb 6.4 oz)   SpO2 98%   BMI 29.90 kg/m²     Physical Exam:      Constitutional:   General: not in acute distress  Appearance: normal appearance, non-toxic, not ill-appearing or diaphoretic.   HENT:   Head: Normocephalic and atraumatic  Eyes: EOMI, PERRL  CV: RRR, Normal Pulses, Normal Heart sounds  Pulm: CTAB, effort normal        DO SUZI Paredes spent a total of 20 minutes on the date of the service which included preparing to see the patient, face-to-face patient care, completing clinical documentation, performing a medically appropriate examination, counseling and educating the patient/family/caregiver and ordering medications, tests, or procedures.

## 2025-06-16 ENCOUNTER — HOSPITAL ENCOUNTER (OUTPATIENT)
Dept: CARDIOLOGY | Facility: CLINIC | Age: 70
Discharge: HOME | End: 2025-06-16
Payer: COMMERCIAL

## 2025-06-16 DIAGNOSIS — I45.9 HEART BLOCK: ICD-10-CM

## 2025-06-16 PROCEDURE — 93280 PM DEVICE PROGR EVAL DUAL: CPT

## 2025-06-20 ENCOUNTER — OFFICE VISIT (OUTPATIENT)
Dept: UROLOGY | Facility: CLINIC | Age: 70
End: 2025-06-20
Payer: COMMERCIAL

## 2025-06-20 VITALS — TEMPERATURE: 97.6 F

## 2025-06-20 DIAGNOSIS — R97.20 BPH WITH ELEVATED PSA: ICD-10-CM

## 2025-06-20 DIAGNOSIS — Z95.0 PACEMAKER: ICD-10-CM

## 2025-06-20 DIAGNOSIS — N40.1 BENIGN PROSTATIC HYPERPLASIA WITH URINARY RETENTION: Primary | ICD-10-CM

## 2025-06-20 DIAGNOSIS — N40.0 BPH WITH ELEVATED PSA: ICD-10-CM

## 2025-06-20 DIAGNOSIS — R33.8 BENIGN PROSTATIC HYPERPLASIA WITH URINARY RETENTION: Primary | ICD-10-CM

## 2025-06-20 DIAGNOSIS — Z79.2 NEED FOR PROPHYLACTIC ANTIBIOTIC: ICD-10-CM

## 2025-06-20 LAB
ANION GAP SERPL CALCULATED.4IONS-SCNC: 9 MMOL/L (CALC) (ref 7–17)
BUN SERPL-MCNC: 20 MG/DL (ref 7–25)
BUN/CREAT SERPL: ABNORMAL (CALC) (ref 6–22)
CALCIUM SERPL-MCNC: 9.2 MG/DL (ref 8.6–10.3)
CHLORIDE SERPL-SCNC: 100 MMOL/L (ref 98–110)
CO2 SERPL-SCNC: 30 MMOL/L (ref 20–32)
CREAT SERPL-MCNC: 0.89 MG/DL (ref 0.7–1.35)
EGFRCR SERPLBLD CKD-EPI 2021: 93 ML/MIN/1.73M2
GLUCOSE SERPL-MCNC: 161 MG/DL (ref 65–139)
POC APPEARANCE, URINE: CLEAR
POC BILIRUBIN, URINE: NEGATIVE
POC BLOOD, URINE: NEGATIVE
POC COLOR, URINE: YELLOW
POC GLUCOSE, URINE: NEGATIVE MG/DL
POC KETONES, URINE: NEGATIVE MG/DL
POC LEUKOCYTES, URINE: ABNORMAL
POC NITRITE,URINE: NEGATIVE
POC PH, URINE: 6 PH
POC PROTEIN, URINE: NEGATIVE MG/DL
POC SPECIFIC GRAVITY, URINE: 1.01
POC UROBILINOGEN, URINE: 0.2 EU/DL
POTASSIUM SERPL-SCNC: 4.5 MMOL/L (ref 3.5–5.3)
SODIUM SERPL-SCNC: 139 MMOL/L (ref 135–146)

## 2025-06-20 PROCEDURE — 99214 OFFICE O/P EST MOD 30 MIN: CPT | Performed by: UROLOGY

## 2025-06-20 PROCEDURE — 81002 URINALYSIS NONAUTO W/O SCOPE: CPT | Performed by: UROLOGY

## 2025-06-20 PROCEDURE — 51703 INSERT BLADDER CATH COMPLEX: CPT | Performed by: UROLOGY

## 2025-06-20 PROCEDURE — 51798 US URINE CAPACITY MEASURE: CPT | Performed by: UROLOGY

## 2025-06-20 PROCEDURE — 1159F MED LIST DOCD IN RCRD: CPT | Performed by: UROLOGY

## 2025-06-20 RX ORDER — CEPHALEXIN 500 MG/1
500 CAPSULE ORAL ONCE
Qty: 1 CAPSULE | Refills: 0 | Status: SHIPPED | OUTPATIENT
Start: 2025-06-20 | End: 2025-06-20

## 2025-06-20 RX ORDER — TAMSULOSIN HYDROCHLORIDE 0.4 MG/1
0.8 CAPSULE ORAL
Qty: 60 CAPSULE | Refills: 11 | Status: SHIPPED | OUTPATIENT
Start: 2025-06-20 | End: 2026-06-20

## 2025-06-20 NOTE — PROGRESS NOTES
Patient was educated on the purpose, frequency, and technique of intermittent self catheterization.     Instructions for Male Patients:   Wash hands thoroughly with soap and water.   Find a comfortable position. Some men prefer to stand for the procedure but it can be done just as easily in the sitting position.   Hold the penis perpendicular to the body (pointing towards the umbilicus) and wash the urethral opening (meatus) with soap and a clean washcloth. For uncircumcised men, retract the foreskin first and clean the meatus in the same way.   Lubricate the tip of the catheter with the water-soluble jelly. Rotate the tip to spread the lubricant around the catheter.   Slowly and gently insert the catheter into the meatus, approximately 6-8 inches or until urine begins to flow. Often the entire length of the catheter must be inserted (to the hub, or end of the catheter) for urine flow to occur.   There may be some resistance to the passage of the catheter at the prostatic urethra, the portion of the urethra where the prostate lies. If this occurs, hold firm, gentle, steady pressure and the external sphincter will fatigue. Muscle relaxation will be felt and the catheter will advance through this part of the urethra.   There may also be resistance at the bladder neck, the internal sphincter (the opening from the urethra to the bladder). Using firm, gentle, steady pressure should cause the muscles to fatigue and allow the catheter to pass into the bladder.   Keep the catheter in place until the flow of urine stops. Slowly and gently withdraw the catheter allowing for any pockets of urine at the base of the bladder to drain. When there is no further flow of urine, remove the catheter.   If requested by the physician, record the amount of urine.   DISCARD used catheter.    Patient was instructed to catheterize 3 times per day. He should continue to void spontaneously.   Discussed risk of gross hematuria from performing  CIC. Emphasized the importance of fluid intake.   Patient observed return demonstration of the technique on himself. Able to verbalize and perform steps correctly with minimal assistance.     He will contact the office if he has any difficulties with ISC when he gets home.

## 2025-06-20 NOTE — PATIENT INSTRUCTIONS
Radiology [phone]  Take antibiotic 1 hour prior to cystoscopy.  We will need a urine sample during that appointment, so please arrive with a full enough bladder to leave a sample.  There are no restrictions in medications, eating/drinking, and driving.

## 2025-06-20 NOTE — PROGRESS NOTES
Scribed for Dr. Ginna Merino by Gerardo Bond. I, Dr. Ginna Merino, have personally reviewed and agreed with the information entered by the Virtual Scribe. 06/20/25    History of Present Illness (HPI):  TODAY: (06/20/25)  Abhijeet Broderick is a 70 y.o. male presenting as a new patient with history of BPH (78g) with LUTS, up-trending PSA. Referred to me to discuss options for his worsening LUTS despite tamsulosin 0.4 mg daily. Noted to be retaining urine, post-void residual: 681 mL. IO urinalysis showed no blood or infection, +trace leuks. In addition, states he recently had a PSA that was acutely elevated compared to prior in May 2024. Expressed interest in an outlet procedure for definitive treatment for his BPH with urinary retention.     He was admitted in Jan 2025 for sick sinus syndrome with symptomatic bradycardia s/p pacemaker placement (01/29/25). Followed and managed by cardiology.     Renal function:  GFR >90, Cr 0.88 (01/31/25)    PSA summary:  3.32 ~ May 2024  3.92 ~ March 2023  3.69 ~ Sept 2022  2.08 ~ Sept 2021  1.66 ~ Nov 2020    Prostate MRI (06/08/23) reviewed.   Prostate weight: 78g.  PSA density: 0.05 ng/mL/g.   PI-RADS 2 only.   No extracapsular extension.   No pelvic lymphadenopathy.     Medical History[1]  Surgical History[2]  Family History[3]  Tobacco Use History[4]  Current Medications[5]  Allergies[6]  Past medical, surgical, family and social history in the chart was reviewed and is accurate including any additions to what is in this HPI.    Review of systems (ROS):   Pertinent information as listed in the HPI.      Objective   Visit Vitals  Temp 36.4 °C (97.6 °F) (Temporal)     Physical Exam:  Constitutional: NAD  HEENT: AT/NC  Resp: Non labored respirations.  Skin: No jaundice or visible skin lesions.  Neuro: No focal deficits.  Psych: Appropriate mood and affect.    Lab Review:  Lab Results   Component Value Date    WBC 10.0 01/31/2025    RBC 5.33 01/31/2025    HGB 15.6 01/31/2025    HCT  45.5 01/31/2025     01/31/2025      Lab Results   Component Value Date    BUN 18 01/31/2025    CREATININE 0.88 01/31/2025      Lab Results   Component Value Date    PSA 3.32 05/20/2024    HGBA1C 5.6 01/04/2024     Lab Results   Component Value Date    CHOL 180 01/04/2024    TRIG 263 (H) 01/04/2024    HDL 46.4 01/04/2024    ALT 16 01/29/2025    AST 16 01/29/2025     01/31/2025    K 4.3 01/31/2025     01/31/2025    CO2 28 01/31/2025    TSH 2.35 01/29/2025    INR 1.1 01/29/2025        ASSESSMENT:  Problem List Items Addressed This Visit    None  Visit Diagnoses         BPH with elevated PSA    -  Primary    Relevant Orders    POCT UA (nonautomated) manually resulted (Completed)           PLAN:  #BPH with urinary retention ~ PVR: 681 mL  #Elevated PSA, up-trending  Discussed non-surgical versus surgical options for his BPH. He elects to increase his tamsulosin to 0.8 mg daily, and start CIC for his retention. Counseled on the risks, benefits, and complications. Advised to place a abbasi if he is unable to self-cath. He agree's to proceed with a prostate MRI and cystoscopy in anticipation of an outlet procedure, as well as for his hx of an up-trending PSA. Evaluate his renal function to r/o acute renal failure. Schedule follow up to review results and for cystoscopy.     All questions were answered to the patient’s satisfaction.  Patient agrees with the plan and wishes to proceed.  Continue follow-up for ongoing care of his chronic medical conditions.    Scribed for Dr. Ginna Merino by Gerardo Bond. I, Dr. Ginna Merino, have personally reviewed and agreed with the information entered by the Virtual Scribe. 06/20/25         [1]   Past Medical History:  Diagnosis Date    Other injury of unspecified body region, initial encounter 11/23/2020    Wound of skin    Personal history of diseases of the blood and blood-forming organs and certain disorders involving the immune mechanism 01/10/2017    History of  leukocytosis    Personal history of diseases of the skin and subcutaneous tissue 01/06/2017    History of eczema    Personal history of diseases of the skin and subcutaneous tissue 02/18/2019    History of actinic keratosis    Unspecified acute conjunctivitis, bilateral 02/18/2019    Conjunctivitis, acute, bilateral   [2]   Past Surgical History:  Procedure Laterality Date    BACK SURGERY      CARDIAC ELECTROPHYSIOLOGY PROCEDURE N/A 1/30/2025    Procedure: PPM IMPLANT DUAL;  Surgeon: Edmund Garcai MD;  Location: [facility] Cardiac [facility] Lab;  Service: Electrophysiology;  Laterality: N/A;    WISDOM TOOTH EXTRACTION      WRIST FRACTURE SURGERY Right    [3]   Family History  Problem Relation Name Age of Onset    Diabetes Mother  86    Diabetes Father      Hyperlipidemia Sister  69    Diabetes Sister     [4]   Social History  Tobacco Use   Smoking Status Never    Passive exposure: Never   Smokeless Tobacco Never   [5]   Current Outpatient Medications   Medication Sig Dispense Refill    halobetasol (UltraVATE) 0.05 % cream APPLY TO AFFECTED AREA ON BODY TWICE DAILY FOR 2 WEEKS AS NEEDED FOR FLARES. DO NOT USE ON FACE (Patient not taking: Reported on 5/6/2025)      losartan (Cozaar) 25 mg tablet Take 1 tablet (25 mg) by mouth once daily. 90 tablet 1    tamsulosin (Flomax) 0.4 mg 24 hr capsule Take 1 capsule (0.4 mg) by mouth once daily. 90 capsule 1     No current facility-administered medications for this visit.   [6] No Known Allergies

## 2025-06-24 ENCOUNTER — APPOINTMENT (OUTPATIENT)
Dept: UROLOGY | Facility: CLINIC | Age: 70
End: 2025-06-24
Payer: COMMERCIAL

## 2025-06-24 VITALS
HEART RATE: 60 BPM | WEIGHT: 215 LBS | BODY MASS INDEX: 30.1 KG/M2 | HEIGHT: 71 IN | SYSTOLIC BLOOD PRESSURE: 169 MMHG | TEMPERATURE: 97.5 F | DIASTOLIC BLOOD PRESSURE: 89 MMHG

## 2025-06-24 DIAGNOSIS — Z01.812 PRE-OPERATIVE LABORATORY EXAMINATION: ICD-10-CM

## 2025-06-24 DIAGNOSIS — N40.1 BENIGN PROSTATIC HYPERPLASIA WITH URINARY RETENTION: Primary | ICD-10-CM

## 2025-06-24 DIAGNOSIS — R33.8 BENIGN PROSTATIC HYPERPLASIA WITH URINARY RETENTION: Primary | ICD-10-CM

## 2025-06-24 LAB
POC APPEARANCE, URINE: CLEAR
POC BILIRUBIN, URINE: NEGATIVE
POC BLOOD, URINE: ABNORMAL
POC COLOR, URINE: YELLOW
POC GLUCOSE, URINE: NEGATIVE MG/DL
POC KETONES, URINE: NEGATIVE MG/DL
POC LEUKOCYTES, URINE: NEGATIVE
POC NITRITE,URINE: NEGATIVE
POC PH, URINE: 6 PH
POC PROTEIN, URINE: NEGATIVE MG/DL
POC SPECIFIC GRAVITY, URINE: 1.01
POC UROBILINOGEN, URINE: 0.2 EU/DL

## 2025-06-24 PROCEDURE — 99215 OFFICE O/P EST HI 40 MIN: CPT | Performed by: UROLOGY

## 2025-06-24 PROCEDURE — 81003 URINALYSIS AUTO W/O SCOPE: CPT | Performed by: UROLOGY

## 2025-06-24 PROCEDURE — 52000 CYSTOURETHROSCOPY: CPT | Performed by: UROLOGY

## 2025-06-24 RX ORDER — SODIUM CHLORIDE, SODIUM LACTATE, POTASSIUM CHLORIDE, CALCIUM CHLORIDE 600; 310; 30; 20 MG/100ML; MG/100ML; MG/100ML; MG/100ML
20 INJECTION, SOLUTION INTRAVENOUS CONTINUOUS
OUTPATIENT
Start: 2025-06-24 | End: 2025-06-25

## 2025-06-24 RX ORDER — CEFAZOLIN SODIUM 2 G/100ML
2 INJECTION, SOLUTION INTRAVENOUS ONCE
OUTPATIENT
Start: 2025-06-24 | End: 2025-06-24

## 2025-06-24 ASSESSMENT — PAIN SCALES - GENERAL: PAINLEVEL_OUTOF10: 0-NO PAIN

## 2025-06-24 NOTE — PROGRESS NOTES
Subjective   Abhijeet Broderick is a 70 y.o. male with history of BPH with LUTS/urinary retention and elevated PSA; presenting today for cystoscopy in anticipation of an outlet procedure. Patient was started on CIC for his retention.             LAST VISIT: (06/20/25)  Abhijeet Broderick is a 70 y.o. male presenting as a new patient with history of BPH (78g) with LUTS, up-trending PSA. Referred to me to discuss options for his worsening LUTS despite tamsulosin 0.4 mg daily. Noted to be retaining urine, post-void residual: 681 mL. IO urinalysis showed no blood or infection, +trace leuks. In addition, states he recently had a PSA that was acutely elevated compared to prior in May 2024. Expressed interest in an outlet procedure for definitive treatment for his BPH with urinary retention.     He was admitted in Jan 2025 for sick sinus syndrome with symptomatic bradycardia s/p pacemaker placement (01/29/25). Followed and managed by cardiology.     Renal function:  GFR >90, Cr 0.88 (01/31/25)    PSA summary:  3.32 ~ May 2024  3.92 ~ March 2023  3.69 ~ Sept 2022  2.08 ~ Sept 2021  1.66 ~ Nov 2020    Prostate MRI (06/08/23) reviewed.   Prostate weight: 78g.  PSA density: 0.05 ng/mL/g.   PI-RADS 2 only.   No extracapsular extension.   No pelvic lymphadenopathy.    Medical History[1]  Surgical History[2]  Family History[3]  Current Medications[4]  Allergies[5]  Social History     Socioeconomic History    Marital status:      Spouse name: Not on file    Number of children: Not on file    Years of education: Not on file    Highest education level: Not on file   Occupational History    Not on file   Tobacco Use    Smoking status: Never     Passive exposure: Never    Smokeless tobacco: Never   Substance and Sexual Activity    Alcohol use: Never    Drug use: Never    Sexual activity: Not on file   Other Topics Concern    Not on file   Social History Narrative    Not on file     Social Drivers of Health     Financial Resource  Strain: Low Risk  (1/30/2025)    Overall Financial Resource Strain (CARDIA)     Difficulty of Paying Living Expenses: Not hard at all   Food Insecurity: No Food Insecurity (1/30/2025)    Hunger Vital Sign     Worried About Running Out of Food in the Last Year: Never true     Ran Out of Food in the Last Year: Never true   Transportation Needs: No Transportation Needs (1/30/2025)    PRAPARE - Transportation     Lack of Transportation (Medical): No     Lack of Transportation (Non-Medical): No   Physical Activity: Not on File (3/11/2021)    Received from [facility] [employer]    Physical Activity     Physical Activity: 0   Stress: Not on File (3/11/2021)    Received from [facility]    Stress     Stress: 0   Social Connections: Not on File (3/11/2021)    Received from [facility] [employer]    Social Connections     Social Connections and Isolation: 0   Intimate Partner Violence: Not At Risk (1/30/2025)    Humiliation, Afraid, Rape, and Kick questionnaire     Fear of Current or Ex-Partner: No     Emotionally Abused: No     Physically Abused: No     Sexually Abused: No   Housing Stability: Low Risk  (1/30/2025)    Housing Stability Vital Sign     Unable to Pay for Housing in the Last Year: No     Number of Times Moved in the Last Year: 0     Homeless in the Last Year: No       Review of Systems  Pertinent items are noted in HPI.    Objective   Cystoscopy performed:   Abhijeet Broderick identified using two (2) forms of identification.  Procedure: diagnostic cystourethroscopy  Indications for procedure: BPH with urinary retention  Risks, benefits, and alternatives were discussed in detail.   Patient appears to understand and agrees to proceed.   Patient has signed the procedure consent form.     Cystoscopy findings:  Urethra: normal course and caliber, no evidence of stricture or lesion.  Prostate: non-obstructing.   Bladder: large capacity, no trabeculations, no diverticulum, no stone, tumors or other lesions.  Post-cystoscopy: Patient tolerated procedure  without complications.    [x] Lateral hypertrophy, with complete channel occlusion.  [] Obstructing median lobe with intravesical protrusion.  [x] Good sphincter coaptation.  [] Normal bladder.         Lab Review  Lab Results   Component Value Date    WBC 10.0 01/31/2025    RBC 5.33 01/31/2025    HGB 15.6 01/31/2025    HCT 45.5 01/31/2025     01/31/2025      Lab Results   Component Value Date    BUN 20 06/20/2025    CREATININE 0.89 06/20/2025      Lab Results   Component Value Date    PSA 3.32 05/20/2024           Assessment/Plan   Diagnoses and all orders for this visit:  Benign prostatic hyperplasia with urinary retention    BPH with LUTS + urinary retention  Elevated PSA     I personally and independently reviewed images of the prostate MRI from 6/08/23 which showed a  78g prostate with no evidence of cancer.     I recommended proceeding with HoLEP. I discussed that a laser will be used to shell out the obstructing tissue from the inside of the prostate gland. I discussed in detail the risks associated with the HoLEP procedure. As with any surgical procedure, HoLEP surgery has some risks. Such as, incontinence of urine which is common for a few months after surgery but is rarely permanent (about one percent of cases), bleeding after surgery, the need for transfusion or another operation due to bleeding, UTI, damage to the bladder, damage to the ureteral orifice (a small tube where kidney drains into the bladder), prolonged need for a catheter after surgery, or scar tissue in the area of surgery or urethra. Retrograde ejaculation was discussed as a permanent irreversible side effect.      All questions were answered to the patient's satisfaction. Patient agrees with the plan and wishes to proceed. Follow-up will be scheduled appropriately.     I spent 40 minutes of dedicated E&M time, including preparation and review of records, notes, and data, time spent with patient/family, and documentation.     E&M  visit today is associated with current or anticipated ongoing medical care services related to a patient's single, serious condition or a complex condition.    Scribed for Dr. Merino by Joya Lam. I , Dr Merino, have personally reviewed and agreed with the information entered by the Virtual Scribe.          [1]   Past Medical History:  Diagnosis Date    Other injury of unspecified body region, initial encounter 11/23/2020    Wound of skin    Personal history of diseases of the blood and blood-forming organs and certain disorders involving the immune mechanism 01/10/2017    History of leukocytosis    Personal history of diseases of the skin and subcutaneous tissue 01/06/2017    History of eczema    Personal history of diseases of the skin and subcutaneous tissue 02/18/2019    History of actinic keratosis    Unspecified acute conjunctivitis, bilateral 02/18/2019    Conjunctivitis, acute, bilateral   [2]   Past Surgical History:  Procedure Laterality Date    BACK SURGERY      CARDIAC ELECTROPHYSIOLOGY PROCEDURE N/A 1/30/2025    Procedure: PPM IMPLANT DUAL;  Surgeon: Edmund Garcia MD;  Location: [facility];  Service: Electrophysiology;  Laterality: N/A;    WISDOM TOOTH EXTRACTION      WRIST FRACTURE SURGERY Right    [3]   Family History  Problem Relation Name Age of Onset    Diabetes Mother  86    Diabetes Father      Hyperlipidemia Sister  69    Diabetes Sister     [4]   Current Outpatient Medications   Medication Sig Dispense Refill    halobetasol (UltraVATE) 0.05 % cream APPLY TO AFFECTED AREA ON BODY TWICE DAILY FOR 2 WEEKS AS NEEDED FOR FLARES. DO NOT USE ON FACE (Patient not taking: Reported on 5/6/2025)      losartan (Cozaar) 25 mg tablet Take 1 tablet (25 mg) by mouth once daily. 90 tablet 1    tamsulosin (Flomax) 0.4 mg 24 hr capsule Take 1 capsule (0.4 mg) by mouth once daily. 90 capsule 1    tamsulosin (Flomax) 0.4 mg 24 hr capsule Take 2 capsules (0.8 mg) by mouth once daily in the morning. Take  before meals. 60 capsule 11     No current facility-administered medications for this visit.   [5] No Known Allergies

## 2025-06-26 ENCOUNTER — PRE-ADMISSION TESTING (OUTPATIENT)
Dept: PREADMISSION TESTING | Facility: HOSPITAL | Age: 70
End: 2025-06-26
Payer: COMMERCIAL

## 2025-06-26 VITALS
WEIGHT: 214.5 LBS | SYSTOLIC BLOOD PRESSURE: 146 MMHG | RESPIRATION RATE: 16 BRPM | OXYGEN SATURATION: 99 % | TEMPERATURE: 98.6 F | BODY MASS INDEX: 30.03 KG/M2 | HEIGHT: 71 IN | HEART RATE: 60 BPM | DIASTOLIC BLOOD PRESSURE: 78 MMHG

## 2025-06-26 DIAGNOSIS — Z01.818 PREOP TESTING: Primary | ICD-10-CM

## 2025-06-26 LAB
ATRIAL RATE: 60 BPM
BACTERIA UR CULT: NORMAL
P AXIS: 44 DEGREES
P OFFSET: 179 MS
P ONSET: 117 MS
PR INTERVAL: 228 MS
Q ONSET: 218 MS
QRS COUNT: 10 BEATS
QRS DURATION: 82 MS
QT INTERVAL: 402 MS
QTC CALCULATION(BAZETT): 402 MS
QTC FREDERICIA: 402 MS
R AXIS: -3 DEGREES
T AXIS: 17 DEGREES
T OFFSET: 419 MS
VENTRICULAR RATE: 60 BPM

## 2025-06-26 PROCEDURE — 99204 OFFICE O/P NEW MOD 45 MIN: CPT | Performed by: NURSE PRACTITIONER

## 2025-06-26 PROCEDURE — 93005 ELECTROCARDIOGRAM TRACING: CPT

## 2025-06-26 PROCEDURE — 93010 ELECTROCARDIOGRAM REPORT: CPT | Performed by: STUDENT IN AN ORGANIZED HEALTH CARE EDUCATION/TRAINING PROGRAM

## 2025-06-26 NOTE — PREPROCEDURE INSTRUCTIONS
Medication List            Accurate as of June 26, 2025  1:06 PM. Always use your most recent med list.                halobetasol 0.05 % cream  Commonly known as: UltraVATE  Medication Adjustments for Surgery: Do Not take on the morning of surgery     losartan 25 mg tablet  Commonly known as: Cozaar  Take 1 tablet (25 mg) by mouth once daily.  Medication Adjustments for Surgery: Take last dose 1 day (24 hours) before surgery     * tamsulosin 0.4 mg 24 hr capsule  Commonly known as: Flomax  Take 1 capsule (0.4 mg) by mouth once daily.  Medication Adjustments for Surgery: Take/Use as prescribed     * tamsulosin 0.4 mg 24 hr capsule  Commonly known as: Flomax  Take 2 capsules (0.8 mg) by mouth once daily in the morning. Take before meals.  Medication Adjustments for Surgery: Take/Use as prescribed           * This list has 2 medication(s) that are the same as other medications prescribed for you. Read the directions carefully, and ask your doctor or other care provider to review them with you.                  NPO Instructions:     Do not eat any food after midnight the night before your surgery/procedure.  You may have clear liquids until TWO hours before surgery/procedure. This includes water, black tea/coffee, (no milk or cream) apple juice and electrolyte drinks (Gatorade).  You may chew gum up to TWO hours before your surgery/procedure.     Additional Instructions:      Seven/Six Days before Surgery:  Review your medication instructions, stop indicated medications  Five Days before Surgery:  Review your medication instructions, stop indicated medications  Three Days before Surgery:  Review your medication instructions, stop indicated medications  The Day before Surgery:  No smoking or alcohol use 24 hours before surgery  Review your medication instructions, stop indicated medications  You will be contacted regarding the time of your arrival to facility and surgery time  Do not eat any food after Midnight  Day  of Surgery:  Review your medication instructions, take indicated medications  If you have diabetes, please check your fasting blood sugar upon awakening.  If fasting blood sugar is <80 mg/dl, drink 100 ml of apple juice, time limit of 2 hours before  You may have clear liquids until TWO hours before surgery/procedure.  This includes water, black tea/coffee, (no milk or cream) apple juice and electrolyte drinks (Gatorade)  You may chew gum up to TWO hours before your surgery/procedure  Wear  comfortable loose fitting clothing  Do not use moisturizers, creams, lotions or perfume  All jewelry and valuables should be left at home     CONTACT SURGEON'S OFFICE IF YOU DEVELOP:  * Fever = 100.4 F   * New respiratory symptoms (e.g. cough, shortness of breath, respiratory distress, sore throat)  * Recent loss of taste or smell  *Flu like symptoms such as headache, fatigue or gastrointestinal symptoms  * You develop any open sores, shingles, burning or painful urination   AND/OR:  * You no longer wish to have the surgery.  * Any other personal circumstances change that may lead to the need to cancel or defer this surgery.  *You were admitted to any hospital within one week of your planned procedure.     SMOKING:  *Quitting smoking can make a huge difference to your health and recovery from surgery.    *If you need help with quitting, call 6-702-QUIT-NOW.     THE DAY BEFORE SURGERY:  *Do not eat any food after midnight the night before your surgery.   *You may have up to TEN OUNCES of clear liquids until TWO hours before your instructed ARRIVAL TIME to hospital. This includes water, black tea/coffee, (no milk or cream) apple juice, clear broth and electrolyte drinks (Gatorade). Please avoid clear liquids that are red in color.   *You may chew gum/mints up to TWO hours before your surgery/procedure.     SURGICAL TIME:  *You will be contacted between 2 p.m. and 3 p.m. the business day before your surgery with your arrival  time.  *If you haven't received a call by 3pm, call [phone]  *Scheduled surgery times may change and you will be notified if this occurs-check your personal voicemail for any updates.     ON THE MORNING OF SURGERY:  *Wear comfortable, loose fitting clothing.   *Do not use moisturizers, creams, lotions or perfume.  *All jewelry and valuables should be left at home.  *Prosthetic devices such as contact lenses, hearing aids, dentures, eyelash extensions, hairpins and body piercing must be removed before surgery.     BRING WITH YOU:  *Photo ID and insurance card  *Current list of medications and allergies  *Pacemaker/Defibrillator/Heart stent cards  *CPAP machine and mask  *Slings/splints/crutches  *Copy of your complete Advanced Directive/DHPOA-if applicable  *Neurostimulator implant remote     PARKING AND ARRIVAL:  *Check in at the Main Entrance desk and let them know you are here for surgery.     IF YOU ARE HAVING OUTPATIENT/SAME DAY SURGERY:  *A responsible adult MUST accompany you at the time of discharge and stay with you for 24 hours after your surgery.  *You may NOT drive yourself home after surgery.  *You may use a taxi or ride sharing service ([employer], [employer]) to return home ONLY if you are accompanied by a friend or family member.  *Instructions for resuming your medications will be provided by your surgeon.     Thank you for coming to Pre Admission testing.      If I have prescribed medication please don't forget to  at your pharmacy.      Any questions about today's visit call [phone] and leave a message in the general mailbox.     Patient instructed to ambulate as soon as possible postoperatively to decrease thromboembolic risk.      Thank you for visiting [facility].  If you have any changes to your health condition, please call the surgeons office to alert them and give them details of your symptoms.        Preoperative Fasting Guidelines     Why must I stop  eating and drinking near surgery time?  With sedation, food or liquid in your stomach can enter your lungs causing serious complications  Increases nausea and vomiting     When do I need to stop eating and drinking before my surgery?  Do not eat any food after midnight the night before your surgery/procedure.  You may have up to TEN ounces of clear liquid until TWO hours before your instructed arrival time to the hospital.  This includes water, black tea/coffee, (no milk or cream) apple juice, and electrolyte drinks (Gatorade)  You may chew gum until TWO hours before your surgery/procedure        Additional Instructions:      The Day before Surgery:  -Review your medication instructions, stop indicated medications  -You will be contacted in the evening regarding the time of your arrival to facility and surgery time     Day of Surgery:  -Review your medication instructions, take indicated medications  -Wear comfortable loose fitting clothing  -Do not use moisturizers, creams, lotions or perfume  -All jewelry and valuables should be left at home                   Preoperative Brain Exercises     What are brain exercises?  A brain exercise is any activity that engages your thinking (cognitive) skills.     What types of activities are considered brain exercises?  Jigsaw puzzles, crossword puzzles, word jumble, memory games, word search, and many more.  Many can be found free online or on your phone via a mobile reji.     Why should I do brain exercises before my surgery?  More recent research has shown brain exercise before surgery can lower the risk of postoperative delirium (confusion) which can be especially important for older adults.  Patients who did brain exercises for 5 to 10 minutes/day in the days before surgery, cut their risk of postoperative delirium in half up to 1 week after surgery.                         [facility]     Preoperative Deep Breathing Exercises     Why it is  important to do deep breathing exercises before my surgery?  Deep breathing exercises strengthen your breathing muscles.  This helps you to recover after your surgery and decreases the chance of breathing complications.        How are the deep breathing exercises done?  Sit straight with your back supported.  Breathe in deeply and slowly through your nose. Your lower rib cage should expand and your abdomen may move forward.  Hold that breath for 3 to 5 seconds.  Breathe out through pursed lips, slowly and completely.  Rest and repeat 10 times every hour while awake.  Rest longer if you become dizzy or lightheaded.                      [facility]     Preoperative Deep Breathing Exercises     Why it is important to do deep breathing exercises before my surgery?  Deep breathing exercises strengthen your breathing muscles.  This helps you to recover after your surgery and decreases the chance of breathing complications.        How are the deep breathing exercises done?  Sit straight with your back supported.  Breathe in deeply and slowly through your nose. Your lower rib cage should expand and your abdomen may move forward.  Hold that breath for 3 to 5 seconds.  Breathe out through pursed lips, slowly and completely.  Rest and repeat 10 times every hour while awake.  Rest longer if you become dizzy or lightheaded.        Patient Information: Incentive Spirometer  What is an incentive spirometer?  An incentive spirometer is a device used before and after surgery to “exercise” your lungs.  It helps you to take deeper breaths to expand your lungs.  Below is an example of a basic incentive spirometer.  The device you receive may differ slightly but they all function the same.    Why do I need to use an incentive spirometer?  Using your incentive spirometer prepares your lungs for surgery and helps prevent lung problems after surgery.  How do I use my incentive spirometer?  When you're using your  incentive spirometer, make sure to breathe through your mouth. If you breathe through your nose, the incentive spirometer won't work properly. You can hold your nose if you have trouble.  If you feel dizzy at any time, stop and rest. Try again at a later time.  Follow the steps below:  Set up your incentive spirometer, expand the flexible tubing and connect to the outlet.  Sit upright in a chair or bed. Hold the incentive spirometer at eye level.   Put the mouthpiece in your mouth and close your lips tightly around it. Slowly breathe out (exhale) completely.  Breathe in (inhale) slowly through your mouth as deeply as you can. As you take a breath, you will see the piston rise inside the large column. While the piston rises, the indicator should move upwards. It should stay in between the 2 arrows (see Figure).  Try to get the piston as high as you can, while keeping the indicator between the arrows.   If the indicator doesn't stay between the arrows, you're breathing either too fast or too slow.  When you get it as high as you can, hold your breath for 10 seconds, or as long as possible. While you're holding your breath, the piston will slowly fall to the base of the spirometer.  Once the piston reaches the bottom of the spirometer, breathe out slowly through your mouth. Rest for a few seconds.  Repeat 10 times. Try to get the piston to the same level with each breath.  Repeat every hour while awake  You can carefully clean the outside of the mouthpiece with an alcohol wipe or soap and water.       Patient and Family Education             Ways You Can Help Prevent Blood Clots                    This handout explains some simple things you can do to help prevent blood clots.      Blood clots are blockages that can form in the body's veins. When a blood clot forms in your deep veins, it may be called a deep vein thrombosis, or DVT for short. Blood clots can happen in any part of the body where blood flows, but they  are most common in the arms and legs. If a piece of a blood clot breaks free and travels to the lungs, it is called a pulmonary embolus (PE). A PE can be a very serious problem.         Being in the hospital or having surgery can raise your chances of getting a blood clot because you may not be well enough to move around as much as you normally do.         Ways you can help prevent blood clots in the hospital           Wearing SCDs. SCDs stands for Sequential Compression Devices.   SCDs are special sleeves that wrap around your legs  They attach to a pump that fills them with air to gently squeeze your legs every few minutes.   This helps return the blood in your legs to your heart.   SCDs should only be taken off when walking or bathing.   SCDs may not be comfortable, but they can help save your life.                                            Wearing compression stockings - if your doctor orders them. These special snug fitting stockings gently squeeze your legs to help blood flow.       Walking. Walking helps move the blood in your legs.   If your doctor says it is ok, try walking the halls at least   5 times a day. Ask us to help you get up, so you don't fall.      Taking any blood thinning medicines your doctor orders.        Page 1 of 2            [facility]; 3/23   Ways you can help prevent blood clots at home         Wearing compression stockings - if your doctor orders them. ? Walking - to help move the blood in your legs.       Taking any blood thinning medicines your doctor orders.      Signs of a blood clot or PE        Tell your doctor or nurse know right away if you have of the problems listed below.    If you are at home, seek medical care right away. Call 911 for chest pain or problems breathing.                Signs of a blood clot (DVT) - such as pain,  swelling, redness or warmth in your arm or leg      Signs of a pulmonary embolism (PE) - such as chest pain or feeling short of  breath    Preoperative Clearances  -If you are informed by the preadmission testing team that you need clearance for your surgery, please reach out to the provider in question to assisting accommodating obtaining your clearance.   -Please have you provider fax your clearance letter to [phone] if needed.   -A blank clearance letter will be provided to you if indicated.     Anticoagulation  -If you are on oral anticoagulation such as Coumadin, Eliquis, Xarelto, Plavix, Brilinta, Pradaxa; we will need to obtain preoperative anticoagulation instructions from the prescribing provider.   -We will reach out to the prescriber but do encourage you to call their office as well as it will increase the chances of getting the necessary information.   -We will contact you with the instruction once we obtain them.

## 2025-06-26 NOTE — H&P (VIEW-ONLY)
"CPM/PAT Evaluation       Name: Abhijeet Broderick (Abhijeet Broderick \"Jose\")  /Age: 1955/70 y.o.     In-Person       Chief Complaint: Benign prostatic hyperplasia with urinary retention     2025  Per Dr Ribeiro Mr. Broderick is intermediate risk   Ebonie Lind, APRN-CNP        Patient is an alert and oriented 70 year old male scheduled for a  HoLEP 90 minutes  on 7/10/25 with Dr. Merino for  Benign prostatic hyperplasia with urinary retention . PMHX includes pacemaker, HTN. Presents to [facility] today for perioperative risk stratification and optimization.     Medical History[1]    Surgical History[2]    Patient  has no history on file for sexual activity.    Family History[3]    Allergies[4]    Prior to Admission medications    Medication Sig Start Date End Date Taking? Authorizing Provider   losartan (Cozaar) 25 mg tablet Take 1 tablet (25 mg) by mouth once daily. 25 Yes Komal Thompson DO   tamsulosin (Flomax) 0.4 mg 24 hr capsule Take 1 capsule (0.4 mg) by mouth once daily. 25  Yes Komal Thompson DO   cephalexin (Keflex) 500 mg capsule Take 1 capsule (500 mg) by mouth 1 time for 1 dose. Take 1 capsule by mouth 1 hour prior to cystoscopy  Patient not taking: Reported on 2025  Ginna Merino MD   halobetasol (UltraVATE) 0.05 % cream APPLY TO AFFECTED AREA ON BODY TWICE DAILY FOR 2 WEEKS AS NEEDED FOR FLARES. DO NOT USE ON FACE  Patient not taking: Reported on 2025   Historical Provider, MD   tamsulosin (Flomax) 0.4 mg 24 hr capsule Take 2 capsules (0.8 mg) by mouth once daily in the morning. Take before meals. 25  Ginna Merino MD          Visit Vitals  /78   Pulse 60   Temp 37 °C (98.6 °F)   Resp 16   Ht 1.803 m (5' 11\")   Wt 97.3 kg (214 lb 8 oz)   SpO2 99%   BMI 29.92 kg/m²   Smoking Status Never   BSA 2.21 m²      Review of Systems   Constitutional: Negative for chills, decreased appetite, diaphoresis, fever and malaise/fatigue. "   Eyes:  Negative for blurred vision and double vision.   Cardiovascular:  Negative for chest pain, claudication, cyanosis, dyspnea on exertion, irregular heartbeat, leg swelling, near-syncope and palpitations.   Respiratory:  Negative for cough, hemoptysis, shortness of breath and wheezing.    Endocrine: Negative for cold intolerance, heat intolerance, polydipsia, polyphagia and polyuria.   Gastrointestinal:  Negative for abdominal pain, constipation, diarrhea, dysphagia, nausea and vomiting.   Genitourinary:  Negative for bladder incontinence, dysuria, hematuria, incomplete emptying, nocturia, frequency, pelvic pain and urgency.   Neurological:  Negative for headaches, light-headedness, paresthesias, sensory change and weakness.   Psychiatric/Behavioral:  Negative for altered mental status.    Musculoskeletal: Negative for myalgias, arthralgias     Vitals and nursing note reviewed.     Physical exam  Constitutional:       Appearance: Normal appearance. He is Overweight.   HENT:      Head: Normocephalic and atraumatic.      Mouth/Throat:      Mouth: Mucous membranes are moist.      Pharynx: Oropharynx is clear.   Eyes:      Extraocular Movements: Extraocular movements intact.      Conjunctiva/sclera: Conjunctivae normal.      Pupils: Pupils are equal, round, and reactive to light.   Cardiovascular:      PMI at left midclavicular line. Normal rate. Regular rhythm. Normal S1. Normal S2.       Murmurs: There is no murmur.      No gallop.  No click. No rub.       No audible carotid bruit     No lower extremity edema on exam  Pulmonary:      Effort: Pulmonary effort is normal.      Breath sounds: Normal breath sounds.   Abdominal:      General: Abdomen is flat. Bowel sounds are normal.      Palpations: Abdomen is soft and non-tender  Musculoskeletal:      Cervical back: Normal range of motion and neck supple.   Skin:     General: Skin is warm and dry.      Capillary Refill: Capillary refill takes less than 2 seconds.    Neurological:      General: No focal deficit present.      Mental Status: He is alert and oriented to person, place, and time. Mental status is at baseline.   Psychiatric:         Mood and Affect: Mood normal.         Behavior: Behavior normal.         Thought Content: Thought content normal.         Judgment: Judgment normal.     Vitals and nursing note reviewed. Physical exam within normal limits.     DASI Risk Score      Flowsheet Row Questionnaire Series Submission from 6/24/2025 in [place] OR with Generic Provider Yary   Can you take care of yourself (eat, dress, bathe, or use toilet)?  2.75  filed at 06/24/2025 1017   Can you walk indoors, such as around your house? 1.75  filed at 06/24/2025 1017   Can you walk a block or two on level ground?  2.75  filed at 06/24/2025 1017   Can you climb a flight of stairs or walk up a hill? 5.5  filed at 06/24/2025 1017   Can you run a short distance? 8  filed at 06/24/2025 1017   Can you do light work around the house like dusting or washing dishes? 2.7  filed at 06/24/2025 1017   Can you do moderate work around the house like vacuuming, sweeping floors or carrying groceries? 3.5  filed at 06/24/2025 1017   Can you do heavy work around the house like scrubbing floors or lifting and moving heavy furniture?  8  filed at 06/24/2025 1017   Can you do yard work like raking leaves, weeding or pushing a mower? 4.5  filed at 06/24/2025 1017   Can you have sexual relations? 5.25  filed at 06/24/2025 1017   Can you participate in moderate recreational activities like golf, bowling, dancing, doubles tennis or throwing a baseball or football? 6  filed at 06/24/2025 1017   Can you participate in strenous sports like swimming, singles tennis, football, basketball, or skiing? 7.5  filed at 06/24/2025 1017   DASI SCORE 58.2  filed at 06/24/2025 1017   METS Score (Will be calculated only when all the questions are answered) 9.9  filed at 06/24/2025 1017           Caprini DVT Assessment      Flowsheet Row Pre-Admission Testing from 6/26/2025 in [facility] ED to Hosp-Admission (Discharged) from 1/29/2025 in [facility] [place] Bldg A 4 with Jose L Cisneros MD and Anders Doll MD   DVT Score (IF A SCORE IS NOT CALCULATING, MUST SELECT A BMI TO COMPLETE) 5 filed at 06/26/2025 1324 3 filed at 01/30/2025 1359   Surgical Factors Major surgery planned, including arthroscopic and laproscopic (1-2 hours) filed at 06/26/2025 1324 --   BMI (BMI MUST BE CHOSEN) 30 or less filed at 06/26/2025 1324 30 or less filed at 01/30/2025 1359          Modified Frailty Index    No data to display       [id number] Stroke Risk Points  Current as of a minute ago        N/A 0 to 9 Points:      Last Change: N/A          The [id number] risk score (Lip GH, et al. 2009. © 2010 American [employer]) quantifies the risk of stroke for a patient with atrial fibrillation. For patients without atrial fibrillation or under the age of 18 this score appears as N/A. Higher score values generally indicate higher risk of stroke.        This score is not applicable to this patient. Components are not calculated.          Revised Cardiac Risk Index      Flowsheet Row Pre-Admission Testing from 6/26/2025 in [facility]   High-Risk Surgery (Intraperitoneal, Intrathoracic,Suprainguinal vascular) 0 filed at 06/26/2025 1327   History of ischemic heart disease (History of MI, History of positive exercuse test, Current chest paint considered due to myocardial ischemia, Use of nitrate therapy, ECG with pathological Q Waves) 0 filed at 06/26/2025 1327   History of congestive heart failure (pulmonary edemia, bilateral rales or S3 gallop, Paroxysmal nocturnal dyspnea, CXR showing pulmonary vascular redistribution) 0 filed at 06/26/2025 1327   History of cerebrovascular disease (Prior TIA or stroke) 0 filed at 06/26/2025 1327   Pre-operative insulin treatment 0  filed at 06/26/2025 1327   Pre-operative creatinine>2 mg/dl 0 filed at 06/26/2025 1327   Revised Cardiac Risk Calculator 0 filed at 06/26/2025 1327          Apfel Simplified Score    No data to display       Risk Analysis Index Results This Encounter    No data found in the last 10 encounters.       Stop Bang Score      Flowsheet Row Pre-Admission Testing from 6/26/2025 in [facility] Questionnaire Series Submission from 6/24/2025 in [facility] OR with Generic Provider Yary   Do you snore loudly? 1 filed at 06/26/2025 1247 1  filed at 06/24/2025 1017   Do you often feel tired or fatigued after your sleep? 0 filed at 06/26/2025 1247 0  filed at 06/24/2025 1017   Has anyone ever observed you stop breathing in your sleep? 0 filed at 06/26/2025 1247 0  filed at 06/24/2025 1017   Do you have or are you being treated for high blood pressure? 1 filed at 06/26/2025 1247 1  filed at 06/24/2025 1017   Recent BMI (Calculated) 29.9 filed at 06/26/2025 1247 30  filed at 06/24/2025 1017   Is BMI greater than 35 kg/m2? 0=No filed at 06/26/2025 1247 0=No  filed at 06/24/2025 1017   Age older than 50 years old? 1=Yes filed at 06/26/2025 1247 1=Yes  filed at 06/24/2025 1017   Is your neck circumference greater than 17 inches (Male) or 16 inches (Female)? 0 filed at 06/26/2025 1247 --   Gender - Male 1=Yes filed at 06/26/2025 1247 1=Yes  filed at 06/24/2025 1017   STOP-BANG Total Score 4 filed at 06/26/2025 1247 --          Prodigy: High Risk  Total Score: 16              Prodigy Age Score      Prodigy Gender Score          ARISCAT Score for Postoperative Pulmonary Complications      Flowsheet Row Pre-Admission Testing from 6/26/2025 in [facility]   Age Calculated Score 3 filed at 06/26/2025 1327   Preoperative SpO2 0 filed at 06/26/2025 1327   Respiratory infection in the last month Either upper or lower (i.e., URI, bronchitis, pneumonia), with fever and antibiotic treatment 0  filed at 06/26/2025 1327   Preoperative anemia (Hgb less than 10 g/dl) 0 filed at 06/26/2025 1327   Surgical incision  0 filed at 06/26/2025 1327   Duration of surgery  0 filed at 06/26/2025 1327   Emergency Procedure  0 filed at 06/26/2025 1327   ARISCAT Total Score  3 filed at 06/26/2025 1327          Michael Perioperative Risk for Myocardial Infarction or Cardiac Arrest (JANICE)      Flowsheet Row Pre-Admission Testing from 6/26/2025 in [facility]   Calculated Age Score 1.36 filed at 06/26/2025 1327   Functional Status  0 filed at 06/26/2025 1327   ASA Class  -3.29 filed at 06/26/2025 1327   Creatinine 0 filed at 06/26/2025 1327   Type of Procedure  -0.26 filed at 06/26/2025 1327   JANICE Total Score  -7.44 filed at 06/26/2025 1327   JANICE % 0.06 filed at 06/26/2025 1327            Assessment & Plan:    Neuro:  No diagnosis or significant findings on chart review or clinical presentation and evaluation.     HEENT/Airway:  No diagnosis or significant findings on chart review or clinical presentation and evaluation.   STOP-BANG Score- 4 points moderate risk for RICHMOND    Mallampati::  II    TM distance::  >3 FB    Neck ROM::  Full  Dentures-denies  Crowns-reports x 5  Implants-denies    Cardiovascular:  Heart block Pacemaker sick sinus syndrome with symptomatic bradycardia status post [employer] dual-chamber pacemaker with Dr. Garcia  (implanted in January 2025) pacer check on 6/16/25  HTN Losartan  follows with Dr. Moser   METS: 9.9  RCRI: 0 points, 3.9%  risk for postoperative MACE   JANICE: 0.06% risk for postoperative MACE  EKG -atrial pacing Rate- 60 No acute changes.     Pulmonary:  No diagnosis or significant findings on chart review or clinical presentation and evaluation.   ARISCAT: <26 points, 1.6% risk of in-hospital postoperative pulmonary complication  PRODIGY: Moderate risk for opioid induced respiratory depression  Smoking History-He has never smoked.  Deep breathing handout  given    Renal/Urinary:   BPH (tamsulosin)   the patient is at increased risk of perioperative renal complications secondary to HTN. Preventative measures include BP monitoring, medication compliance, and hydration management.   CMP-Pending  Creatinine-  GFR-    Endocrine:  No diagnosis or significant findings on chart review or clinical presentation and evaluation.     Hematologic/Immunology:  No diagnosis or significant findings on chart review or clinical presentation and evaluation.  The patient is not a Jehovah’s witness and will accept blood and blood products if medically indicated.   History of previous blood transfusions No  CBC-Pending  HGB-  Caprini Score 5, patient at Low for postoperative DVT. Pt supplied education/VTE handout  Anticoagulation use: No     Gastrointestinal:   No diagnosis or significant findings on chart review or clinical presentation and evaluation.   Recreational drug use: none  Alcohol use holidays     Infectious disease:   No diagnosis or significant findings on chart review or clinical presentation and evaluation.     Musculoskeletal:   No diagnosis or significant findings on chart review or clinical presentation and evaluation.   JHFRAT score-6 points. moderate risk for falls    Anesthesia:  ASA 2 - Patient with mild systemic disease with no functional limitations  Anticipated anesthesia-general  History of General anesthesia- yes  Complications- No anesthesia complications  No family history of anesthesia complications    Labs & Imaging ordered:  CBC, CMP, UA, EKG  Nickel/metal allergy-negative  Shellfish allergy-negative    Discussed with patient medication instructions, NPO guidelines, and any questions or concerns.   Will need clearance from Dr. Ribeiro  time spent 45 minutes  All resulted testing from PAT was forwarded to the surgeon and the patient's PCP if applicable.           [1]   Past Medical History:  Diagnosis Date    BPH (benign prostatic hyperplasia)     Other  injury of unspecified body region, initial encounter 11/23/2020    Wound of skin    Pacemaker     Personal history of diseases of the blood and blood-forming organs and certain disorders involving the immune mechanism 01/10/2017    History of leukocytosis    Personal history of diseases of the skin and subcutaneous tissue 01/06/2017    History of eczema    Personal history of diseases of the skin and subcutaneous tissue 02/18/2019    History of actinic keratosis    Unspecified acute conjunctivitis, bilateral 02/18/2019    Conjunctivitis, acute, bilateral   [2]   Past Surgical History:  Procedure Laterality Date    BACK SURGERY      CARDIAC ELECTROPHYSIOLOGY PROCEDURE N/A 1/30/2025    Procedure: PPM IMPLANT DUAL;  Surgeon: Edmund Garcia MD;  Location: [facility] Cardiac Cath Lab;  Service: Electrophysiology;  Laterality: N/A;    WISDOM TOOTH EXTRACTION      WRIST FRACTURE SURGERY Right    [3]   Family History  Problem Relation Name Age of Onset    Diabetes Mother  86    Diabetes Father      Hyperlipidemia Sister  69    Diabetes Sister     [4] No Known Allergies

## 2025-06-26 NOTE — CPM/PAT H&P
"CPM/PAT Evaluation       Name: Abhijeet Broderick (Abhijeet Broderick \"Jose\")  /Age: 1955/70 y.o.     In-Person       Chief Complaint: Benign prostatic hyperplasia with urinary retention     HPI  Patient is an alert and oriented 70 year old male scheduled for a  HoLEP 90 minutes  on 7/10/25 with Dr. Merino for  Benign prostatic hyperplasia with urinary retention . PMHX includes pacemaker, HTN. Presents to [facility] today for perioperative risk stratification and optimization.     Medical History[1]    Surgical History[2]    Patient  has no history on file for sexual activity.    Family History[3]    Allergies[4]    Prior to Admission medications    Medication Sig Start Date End Date Taking? Authorizing Provider   losartan (Cozaar) 25 mg tablet Take 1 tablet (25 mg) by mouth once daily. 25 Yes Komal Thompson DO   tamsulosin (Flomax) 0.4 mg 24 hr capsule Take 1 capsule (0.4 mg) by mouth once daily. 25  Yes Komal Thompson DO   cephalexin (Keflex) 500 mg capsule Take 1 capsule (500 mg) by mouth 1 time for 1 dose. Take 1 capsule by mouth 1 hour prior to cystoscopy  Patient not taking: Reported on 2025  Ginna Merino MD   halobetasol (UltraVATE) 0.05 % cream APPLY TO AFFECTED AREA ON BODY TWICE DAILY FOR 2 WEEKS AS NEEDED FOR FLARES. DO NOT USE ON FACE  Patient not taking: Reported on 2025   Historical Provider, MD   tamsulosin (Flomax) 0.4 mg 24 hr capsule Take 2 capsules (0.8 mg) by mouth once daily in the morning. Take before meals. 25  Ginna Merino MD          Visit Vitals  /78   Pulse 60   Temp 37 °C (98.6 °F)   Resp 16   Ht 1.803 m (5' 11\")   Wt 97.3 kg (214 lb 8 oz)   SpO2 99%   BMI 29.92 kg/m²   Smoking Status Never   BSA 2.21 m²      Review of Systems   Constitutional: Negative for chills, decreased appetite, diaphoresis, fever and malaise/fatigue.   Eyes:  Negative for blurred vision and double vision.   Cardiovascular:  Negative for " chest pain, claudication, cyanosis, dyspnea on exertion, irregular heartbeat, leg swelling, near-syncope and palpitations.   Respiratory:  Negative for cough, hemoptysis, shortness of breath and wheezing.    Endocrine: Negative for cold intolerance, heat intolerance, polydipsia, polyphagia and polyuria.   Gastrointestinal:  Negative for abdominal pain, constipation, diarrhea, dysphagia, nausea and vomiting.   Genitourinary:  Negative for bladder incontinence, dysuria, hematuria, incomplete emptying, nocturia, frequency, pelvic pain and urgency.   Neurological:  Negative for headaches, light-headedness, paresthesias, sensory change and weakness.   Psychiatric/Behavioral:  Negative for altered mental status.    Musculoskeletal: Negative for myalgias, arthralgias     Vitals and nursing note reviewed.     Physical exam  Constitutional:       Appearance: Normal appearance. He is Overweight.   HENT:      Head: Normocephalic and atraumatic.      Mouth/Throat:      Mouth: Mucous membranes are moist.      Pharynx: Oropharynx is clear.   Eyes:      Extraocular Movements: Extraocular movements intact.      Conjunctiva/sclera: Conjunctivae normal.      Pupils: Pupils are equal, round, and reactive to light.   Cardiovascular:      PMI at left midclavicular line. Normal rate. Regular rhythm. Normal S1. Normal S2.       Murmurs: There is no murmur.      No gallop.  No click. No rub.       No audible carotid bruit     No lower extremity edema on exam  Pulmonary:      Effort: Pulmonary effort is normal.      Breath sounds: Normal breath sounds.   Abdominal:      General: Abdomen is flat. Bowel sounds are normal.      Palpations: Abdomen is soft and non-tender  Musculoskeletal:      Cervical back: Normal range of motion and neck supple.   Skin:     General: Skin is warm and dry.      Capillary Refill: Capillary refill takes less than 2 seconds.   Neurological:      General: No focal deficit present.      Mental Status: He is alert  and oriented to person, place, and time. Mental status is at baseline.   Psychiatric:         Mood and Affect: Mood normal.         Behavior: Behavior normal.         Thought Content: Thought content normal.         Judgment: Judgment normal.     Vitals and nursing note reviewed. Physical exam within normal limits.     DASI Risk Score      Flowsheet Row Questionnaire Series Submission from 6/24/2025 in [facility] OR with Generic Provider Yary   Can you take care of yourself (eat, dress, bathe, or use toilet)?  2.75  filed at 06/24/2025 1017   Can you walk indoors, such as around your house? 1.75  filed at 06/24/2025 1017   Can you walk a block or two on level ground?  2.75  filed at 06/24/2025 1017   Can you climb a flight of stairs or walk up a hill? 5.5  filed at 06/24/2025 1017   Can you run a short distance? 8  filed at 06/24/2025 1017   Can you do light work around the house like dusting or washing dishes? 2.7  filed at 06/24/2025 1017   Can you do moderate work around the house like vacuuming, sweeping floors or carrying groceries? 3.5  filed at 06/24/2025 1017   Can you do heavy work around the house like scrubbing floors or lifting and moving heavy furniture?  8  filed at 06/24/2025 1017   Can you do yard work like raking leaves, weeding or pushing a mower? 4.5  filed at 06/24/2025 1017   Can you have sexual relations? 5.25  filed at 06/24/2025 1017   Can you participate in moderate recreational activities like golf, bowling, dancing, doubles tennis or throwing a baseball or football? 6  filed at 06/24/2025 1017   Can you participate in strenous sports like swimming, singles tennis, football, basketball, or skiing? 7.5  filed at 06/24/2025 1017   DASI SCORE 58.2  filed at 06/24/2025 1017   METS Score (Will be calculated only when all the questions are answered) 9.9  filed at 06/24/2025 1017          Caprini DVT Assessment      Flowsheet Row Pre-Admission Testing from 6/26/2025 in   [facility] ED to Hosp-Admission (Discharged) from 1/29/2025 in [facility] Bldg A 4 with Jose L Cisneros MD and Anders Doll MD   DVT Score (IF A SCORE IS NOT CALCULATING, MUST SELECT A BMI TO COMPLETE) 5 filed at 06/26/2025 1324 3 filed at 01/30/2025 1359   Surgical Factors Major surgery planned, including arthroscopic and laproscopic (1-2 hours) filed at 06/26/2025 1324 --   BMI (BMI MUST BE CHOSEN) 30 or less filed at 06/26/2025 1324 30 or less filed at 01/30/2025 1359          Modified Frailty Index    No data to display       [id number] Stroke Risk Points  Current as of a minute ago        N/A 0 to 9 Points:      Last Change: N/A          The [id number] risk score (Lip ALTAGRACIA, et al. 2009. © 2010 American College [employer]) quantifies the risk of stroke for a patient with atrial fibrillation. For patients without atrial fibrillation or under the age of 18 this score appears as N/A. Higher score values generally indicate higher risk of stroke.        This score is not applicable to this patient. Components are not calculated.          Revised Cardiac Risk Index      Flowsheet Row Pre-Admission Testing from 6/26/2025 in [facility]   High-Risk Surgery (Intraperitoneal, Intrathoracic,Suprainguinal vascular) 0 filed at 06/26/2025 1327   History of ischemic heart disease (History of MI, History of positive exercuse test, Current chest paint considered due to myocardial ischemia, Use of nitrate therapy, ECG with pathological Q Waves) 0 filed at 06/26/2025 1327   History of congestive heart failure (pulmonary edemia, bilateral rales or S3 gallop, Paroxysmal nocturnal dyspnea, CXR showing pulmonary vascular redistribution) 0 filed at 06/26/2025 1327   History of cerebrovascular disease (Prior TIA or stroke) 0 filed at 06/26/2025 1327   Pre-operative insulin treatment 0 filed at 06/26/2025 1327   Pre-operative creatinine>2 mg/dl 0 filed at 06/26/2025 1327    Revised Cardiac Risk Calculator 0 filed at 06/26/2025 1327          Apfel Simplified Score    No data to display       Risk Analysis Index Results This Encounter    No data found in the last 10 encounters.       Stop Bang Score      Flowsheet Row Pre-Admission Testing from 6/26/2025 in [facility] Questionnaire Series Submission from 6/24/2025 in [facility] OR with Generic Provider Not   Do you snore loudly? 1 filed at 06/26/2025 1247 1  filed at 06/24/2025 1017   Do you often feel tired or fatigued after your sleep? 0 filed at 06/26/2025 1247 0  filed at 06/24/2025 1017   Has anyone ever observed you stop breathing in your sleep? 0 filed at 06/26/2025 1247 0  filed at 06/24/2025 1017   Do you have or are you being treated for high blood pressure? 1 filed at 06/26/2025 1247 1  filed at 06/24/2025 1017   Recent BMI (Calculated) 29.9 filed at 06/26/2025 1247 30  filed at 06/24/2025 1017   Is BMI greater than 35 kg/m2? 0=No filed at 06/26/2025 1247 0=No  filed at 06/24/2025 1017   Age older than 50 years old? 1=Yes filed at 06/26/2025 1247 1=Yes  filed at 06/24/2025 1017   Is your neck circumference greater than 17 inches (Male) or 16 inches (Female)? 0 filed at 06/26/2025 1247 --   Gender - Male 1=Yes filed at 06/26/2025 1247 1=Yes  filed at 06/24/2025 1017   STOP-BANG Total Score 4 filed at 06/26/2025 1247 --          Prodigy: High Risk  Total Score: 16              Prodigy Age Score      Prodigy Gender Score          ARISCAT Score for Postoperative Pulmonary Complications      Flowsheet Row Pre-Admission Testing from 6/26/2025 in [facility]   Age Calculated Score 3 filed at 06/26/2025 1327   Preoperative SpO2 0 filed at 06/26/2025 1327   Respiratory infection in the last month Either upper or lower (i.e., URI, bronchitis, pneumonia), with fever and antibiotic treatment 0 filed at 06/26/2025 1327   Preoperative anemia (Hgb less than 10 g/dl) 0 filed at  06/26/2025 1327   Surgical incision  0 filed at 06/26/2025 1327   Duration of surgery  0 filed at 06/26/2025 1327   Emergency Procedure  0 filed at 06/26/2025 1327   ARISCAT Total Score  3 filed at 06/26/2025 1327          Michael Perioperative Risk for Myocardial Infarction or Cardiac Arrest (JANICE)      Flowsheet Row Pre-Admission Testing from 6/26/2025 in [facility]   Calculated Age Score 1.36 filed at 06/26/2025 1327   Functional Status  0 filed at 06/26/2025 1327   ASA Class  -3.29 filed at 06/26/2025 1327   Creatinine 0 filed at 06/26/2025 1327   Type of Procedure  -0.26 filed at 06/26/2025 1327   JANICE Total Score  -7.44 filed at 06/26/2025 1327   JANICE % 0.06 filed at 06/26/2025 1327            Assessment & Plan:    Neuro:  No diagnosis or significant findings on chart review or clinical presentation and evaluation.     HEENT/Airway:  No diagnosis or significant findings on chart review or clinical presentation and evaluation.   STOP-BANG Score- 4 points moderate risk for RICHMOND    Mallampati::  II    TM distance::  >3 FB    Neck ROM::  Full  Dentures-denies  Crowns-reports x 5  Implants-denies    Cardiovascular:  Heart block Pacemaker sick sinus syndrome with symptomatic bradycardia status post [employer] dual-chamber pacemaker with Dr. Garcia  (implanted in January 2025) pacer check on 6/16/25  HTN Losartan  follows with Dr. Moser   METS: 9.9  RCRI: 0 points, 3.9%  risk for postoperative MACE   JANICE: 0.06% risk for postoperative MACE  EKG -atrial pacing Rate- 60 No acute changes.     Pulmonary:  No diagnosis or significant findings on chart review or clinical presentation and evaluation.   ARISCAT: <26 points, 1.6% risk of in-hospital postoperative pulmonary complication  PRODIGY: Moderate risk for opioid induced respiratory depression  Smoking History-He has never smoked.  Deep breathing handout given    Renal/Urinary:   BPH (tamsulosin)   the patient is at increased risk of perioperative renal  complications secondary to HTN. Preventative measures include BP monitoring, medication compliance, and hydration management.   CMP-Pending  Creatinine-  GFR-    Endocrine:  No diagnosis or significant findings on chart review or clinical presentation and evaluation.     Hematologic/Immunology:  No diagnosis or significant findings on chart review or clinical presentation and evaluation.  The patient is not a Jehovah’s witness and will accept blood and blood products if medically indicated.   History of previous blood transfusions No  CBC-Pending  HGB-  Caprini Score 5, patient at Low for postoperative DVT. Pt supplied education/VTE handout  Anticoagulation use: No     Gastrointestinal:   No diagnosis or significant findings on chart review or clinical presentation and evaluation.   Recreational drug use: none  Alcohol use holidays     Infectious disease:   No diagnosis or significant findings on chart review or clinical presentation and evaluation.     Musculoskeletal:   No diagnosis or significant findings on chart review or clinical presentation and evaluation.   JHFRAT score-6 points. moderate risk for falls    Anesthesia:  ASA 2 - Patient with mild systemic disease with no functional limitations  Anticipated anesthesia-general  History of General anesthesia- yes  Complications- No anesthesia complications  No family history of anesthesia complications    Labs & Imaging ordered:  CBC, CMP, UA, EKG  Nickel/metal allergy-negative  Shellfish allergy-negative    Discussed with patient medication instructions, NPO guidelines, and any questions or concerns.   Will need clearance from Dr. Ribeiro  time spent 45 minutes  All resulted testing from PAT was forwarded to the surgeon and the patient's PCP if applicable.           [1]   Past Medical History:  Diagnosis Date    BPH (benign prostatic hyperplasia)     Other injury of unspecified body region, initial encounter 11/23/2020    Wound of skin    Pacemaker     Personal  history of diseases of the blood and blood-forming organs and certain disorders involving the immune mechanism 01/10/2017    History of leukocytosis    Personal history of diseases of the skin and subcutaneous tissue 01/06/2017    History of eczema    Personal history of diseases of the skin and subcutaneous tissue 02/18/2019    History of actinic keratosis    Unspecified acute conjunctivitis, bilateral 02/18/2019    Conjunctivitis, acute, bilateral   [2]   Past Surgical History:  Procedure Laterality Date    BACK SURGERY      CARDIAC ELECTROPHYSIOLOGY PROCEDURE N/A 1/30/2025    Procedure: PPM IMPLANT DUAL;  Surgeon: Edmund Garcia MD;  Location: [facility] Cardiac Cath Lab;  Service: Electrophysiology;  Laterality: N/A;    WISDOM TOOTH EXTRACTION      WRIST FRACTURE SURGERY Right    [3]   Family History  Problem Relation Name Age of Onset    Diabetes Mother  86    Diabetes Father      Hyperlipidemia Sister  69    Diabetes Sister     [4] No Known Allergies

## 2025-06-27 LAB
ANION GAP SERPL CALCULATED.4IONS-SCNC: 8 MMOL/L (CALC) (ref 7–17)
BUN SERPL-MCNC: 17 MG/DL (ref 7–25)
BUN/CREAT SERPL: NORMAL (CALC) (ref 6–22)
CALCIUM SERPL-MCNC: 9.5 MG/DL (ref 8.6–10.3)
CHLORIDE SERPL-SCNC: 102 MMOL/L (ref 98–110)
CO2 SERPL-SCNC: 30 MMOL/L (ref 20–32)
CREAT SERPL-MCNC: 0.83 MG/DL (ref 0.7–1.35)
EGFRCR SERPLBLD CKD-EPI 2021: 95 ML/MIN/1.73M2
ERYTHROCYTE [DISTWIDTH] IN BLOOD BY AUTOMATED COUNT: 12.8 % (ref 11–15)
GLUCOSE SERPL-MCNC: 86 MG/DL (ref 65–99)
HCT VFR BLD AUTO: 47.8 % (ref 38.5–50)
HGB BLD-MCNC: 15.9 G/DL (ref 13.2–17.1)
INR PPP: 1
MCH RBC QN AUTO: 29.1 PG (ref 27–33)
MCHC RBC AUTO-ENTMCNC: 33.3 G/DL (ref 32–36)
MCV RBC AUTO: 87.5 FL (ref 80–100)
PLATELET # BLD AUTO: 259 THOUSAND/UL (ref 140–400)
PMV BLD REES-ECKER: 9.8 FL (ref 7.5–12.5)
POTASSIUM SERPL-SCNC: 4.3 MMOL/L (ref 3.5–5.3)
PROTHROMBIN TIME: 10.6 SEC (ref 9–11.5)
RBC # BLD AUTO: 5.46 MILLION/UL (ref 4.2–5.8)
SODIUM SERPL-SCNC: 140 MMOL/L (ref 135–146)
WBC # BLD AUTO: 8.1 THOUSAND/UL (ref 3.8–10.8)

## 2025-06-28 NOTE — PROGRESS NOTES
Assessment/Plan     Follow up in 3 months   Problem List Items Addressed This Visit       Heart block    Overview     Diagnosed 1/31/25  Sinus Arrest w/ junctional escape rhythm   Pacemaker placed 2/25  Following w/ cardiology         Pacemaker - Primary         Subjective   Patient ID: Abhijeet Broderick is a 69 y.o. male who presents for Hospital Follow-up.    Pt presents for follow up for heart block that was diagnosed as sinus arrest w/ junctional escape rhythm s/p pacemaker placement.     He is doing well;   He states that he can breathe much better; he even can think more clearly   He is walking for exercise now and is not doing any lifting w/ his arms.     He believes his urination issues as of recent was more so related to cardiac arrhythmia. He wants to wait while he heals from this procedure before pursuing any further workup.     Objective   /86 (BP Location: Left arm, Patient Position: Sitting, BP Cuff Size: Adult)   Pulse 80   Temp 36.4 °C (97.5 °F) (Skin)   Wt 96.5 kg (212 lb 12.8 oz)   SpO2 95%   BMI 29.68 kg/m²     Physical Exam:     Constitutional:   General: not in acute distress  Appearance: normal appearance, non-toxic, not ill-appearing or diaphoretic.   HENT:   Head: Normocephalic and atraumatic  Eyes: EOMI, PERRL  CV: RRR, Normal Pulses, Normal Heart sounds  Pulm: CTAB, effort normal  Neuro: CN II-XII Intact, alert, oriented x3          DO SUZI Paredes spent a total of 23 minutes on the date of the service which included preparing to see the patient, face-to-face patient care, completing clinical documentation, performing a medically appropriate examination, counseling and educating the patient/family/caregiver and ordering medications, tests, or procedures.   
-Hold home Lisinopril 40 mg daily per cardio  - Monitor vitals
-Hold home Lisinopril 40 mg daily per cardio  - Monitor vitals

## 2025-07-08 ENCOUNTER — HOSPITAL ENCOUNTER (OUTPATIENT)
Dept: CARDIOLOGY | Facility: HOSPITAL | Age: 70
Discharge: HOME | End: 2025-07-08
Payer: COMMERCIAL

## 2025-07-08 ENCOUNTER — HOSPITAL ENCOUNTER (OUTPATIENT)
Dept: RADIOLOGY | Facility: HOSPITAL | Age: 70
Discharge: HOME | End: 2025-07-08
Payer: COMMERCIAL

## 2025-07-08 VITALS — DIASTOLIC BLOOD PRESSURE: 81 MMHG | SYSTOLIC BLOOD PRESSURE: 141 MMHG | OXYGEN SATURATION: 94 % | HEART RATE: 60 BPM

## 2025-07-08 DIAGNOSIS — N40.1 BENIGN PROSTATIC HYPERPLASIA WITH URINARY RETENTION: ICD-10-CM

## 2025-07-08 DIAGNOSIS — N40.1 BENIGN PROSTATIC HYPERPLASIA WITH LOWER URINARY TRACT SYMPTOMS: ICD-10-CM

## 2025-07-08 DIAGNOSIS — R97.20 BPH WITH ELEVATED PSA: ICD-10-CM

## 2025-07-08 DIAGNOSIS — N40.0 BPH WITH ELEVATED PSA: ICD-10-CM

## 2025-07-08 DIAGNOSIS — R33.8 BENIGN PROSTATIC HYPERPLASIA WITH URINARY RETENTION: ICD-10-CM

## 2025-07-08 PROCEDURE — 72197 MRI PELVIS W/O & W/DYE: CPT

## 2025-07-08 PROCEDURE — A9575 INJ GADOTERATE MEGLUMI 0.1ML: HCPCS | Performed by: UROLOGY

## 2025-07-08 PROCEDURE — 93286 PERI-PX EVAL PM/LDLS PM IP: CPT

## 2025-07-08 PROCEDURE — 72197 MRI PELVIS W/O & W/DYE: CPT | Performed by: RADIOLOGY

## 2025-07-08 PROCEDURE — 2550000001 HC RX 255 CONTRASTS: Performed by: UROLOGY

## 2025-07-08 RX ORDER — GADOTERATE MEGLUMINE 376.9 MG/ML
19 INJECTION INTRAVENOUS
Status: COMPLETED | OUTPATIENT
Start: 2025-07-08 | End: 2025-07-08

## 2025-07-08 RX ADMIN — GADOTERATE MEGLUMINE 19 ML: 376.9 INJECTION INTRAVENOUS at 10:54

## 2025-07-08 NOTE — NURSING NOTE
Patient completed MRI and the device clinical nurse arrived checked patient's pacemaker and set it back into the routine that it was before MRI, per device nurse.

## 2025-07-08 NOTE — NURSING NOTE
Patient is alert and able to make needs known; has a pacemaker and at approximately 9:58 the device clinical nurse arrived checked patient's pacemaker, interrogated and set it at DOO 85 BMP, per device nurse. Patient stated that he's feeling fine and no signs of discomfort or distress noted by this nurse.

## 2025-07-10 ENCOUNTER — HOSPITAL ENCOUNTER (OUTPATIENT)
Facility: HOSPITAL | Age: 70
Setting detail: OUTPATIENT SURGERY
Discharge: HOME | End: 2025-07-10
Attending: UROLOGY | Admitting: UROLOGY
Payer: COMMERCIAL

## 2025-07-10 ENCOUNTER — ANESTHESIA EVENT (OUTPATIENT)
Dept: OPERATING ROOM | Facility: HOSPITAL | Age: 70
End: 2025-07-10
Payer: COMMERCIAL

## 2025-07-10 ENCOUNTER — ANESTHESIA (OUTPATIENT)
Dept: OPERATING ROOM | Facility: HOSPITAL | Age: 70
End: 2025-07-10
Payer: COMMERCIAL

## 2025-07-10 VITALS
RESPIRATION RATE: 14 BRPM | OXYGEN SATURATION: 95 % | HEART RATE: 60 BPM | SYSTOLIC BLOOD PRESSURE: 158 MMHG | DIASTOLIC BLOOD PRESSURE: 92 MMHG | HEIGHT: 71 IN | TEMPERATURE: 97.3 F | WEIGHT: 212.08 LBS | BODY MASS INDEX: 29.69 KG/M2

## 2025-07-10 DIAGNOSIS — N40.1 BENIGN PROSTATIC HYPERPLASIA WITH URINARY RETENTION: Primary | ICD-10-CM

## 2025-07-10 DIAGNOSIS — R33.8 BENIGN PROSTATIC HYPERPLASIA WITH URINARY RETENTION: Primary | ICD-10-CM

## 2025-07-10 PROCEDURE — 3600000009 HC OR TIME - EACH INCREMENTAL 1 MINUTE - PROCEDURE LEVEL FOUR: Performed by: UROLOGY

## 2025-07-10 PROCEDURE — 2500000004 HC RX 250 GENERAL PHARMACY W/ HCPCS (ALT 636 FOR OP/ED): Performed by: UROLOGY

## 2025-07-10 PROCEDURE — 3600000004 HC OR TIME - INITIAL BASE CHARGE - PROCEDURE LEVEL FOUR: Performed by: UROLOGY

## 2025-07-10 PROCEDURE — 7100000002 HC RECOVERY ROOM TIME - EACH INCREMENTAL 1 MINUTE: Performed by: UROLOGY

## 2025-07-10 PROCEDURE — 2720000007 HC OR 272 NO HCPCS: Performed by: UROLOGY

## 2025-07-10 PROCEDURE — 7100000009 HC PHASE TWO TIME - INITIAL BASE CHARGE: Performed by: UROLOGY

## 2025-07-10 PROCEDURE — A52649 PR LASER ENUCLEATION PROSTATE W MORCELLATION: Performed by: NURSE ANESTHETIST, CERTIFIED REGISTERED

## 2025-07-10 PROCEDURE — C1758 CATHETER, URETERAL: HCPCS | Performed by: UROLOGY

## 2025-07-10 PROCEDURE — 2500000005 HC RX 250 GENERAL PHARMACY W/O HCPCS: Performed by: NURSE ANESTHETIST, CERTIFIED REGISTERED

## 2025-07-10 PROCEDURE — 88307 TISSUE EXAM BY PATHOLOGIST: CPT | Performed by: PATHOLOGY

## 2025-07-10 PROCEDURE — 2500000004 HC RX 250 GENERAL PHARMACY W/ HCPCS (ALT 636 FOR OP/ED): Performed by: NURSE ANESTHETIST, CERTIFIED REGISTERED

## 2025-07-10 PROCEDURE — 2500000004 HC RX 250 GENERAL PHARMACY W/ HCPCS (ALT 636 FOR OP/ED): Performed by: ANESTHESIOLOGIST ASSISTANT

## 2025-07-10 PROCEDURE — 7100000001 HC RECOVERY ROOM TIME - INITIAL BASE CHARGE: Performed by: UROLOGY

## 2025-07-10 PROCEDURE — 2500000005 HC RX 250 GENERAL PHARMACY W/O HCPCS: Performed by: UROLOGY

## 2025-07-10 PROCEDURE — 3700000002 HC GENERAL ANESTHESIA TIME - EACH INCREMENTAL 1 MINUTE: Performed by: UROLOGY

## 2025-07-10 PROCEDURE — 88307 TISSUE EXAM BY PATHOLOGIST: CPT | Mod: TC,SUR,BEALAB | Performed by: UROLOGY

## 2025-07-10 PROCEDURE — A52649 PR LASER ENUCLEATION PROSTATE W MORCELLATION: Performed by: STUDENT IN AN ORGANIZED HEALTH CARE EDUCATION/TRAINING PROGRAM

## 2025-07-10 PROCEDURE — C1889 IMPLANT/INSERT DEVICE, NOC: HCPCS | Performed by: UROLOGY

## 2025-07-10 PROCEDURE — 7100000010 HC PHASE TWO TIME - EACH INCREMENTAL 1 MINUTE: Performed by: UROLOGY

## 2025-07-10 PROCEDURE — 3700000001 HC GENERAL ANESTHESIA TIME - INITIAL BASE CHARGE: Performed by: UROLOGY

## 2025-07-10 PROCEDURE — 52649 PROSTATE LASER ENUCLEATION: CPT | Performed by: UROLOGY

## 2025-07-10 RX ORDER — FENTANYL CITRATE 50 UG/ML
INJECTION, SOLUTION INTRAMUSCULAR; INTRAVENOUS AS NEEDED
Status: DISCONTINUED | OUTPATIENT
Start: 2025-07-10 | End: 2025-07-10

## 2025-07-10 RX ORDER — MEPERIDINE HYDROCHLORIDE 25 MG/ML
12.5 INJECTION INTRAMUSCULAR; INTRAVENOUS; SUBCUTANEOUS EVERY 10 MIN PRN
Status: DISCONTINUED | OUTPATIENT
Start: 2025-07-10 | End: 2025-07-10 | Stop reason: HOSPADM

## 2025-07-10 RX ORDER — PROPOFOL 10 MG/ML
INJECTION, EMULSION INTRAVENOUS AS NEEDED
Status: DISCONTINUED | OUTPATIENT
Start: 2025-07-10 | End: 2025-07-10

## 2025-07-10 RX ORDER — LIDOCAINE HYDROCHLORIDE 20 MG/ML
JELLY TOPICAL AS NEEDED
Status: DISCONTINUED | OUTPATIENT
Start: 2025-07-10 | End: 2025-07-10 | Stop reason: HOSPADM

## 2025-07-10 RX ORDER — ALBUTEROL SULFATE 0.83 MG/ML
2.5 SOLUTION RESPIRATORY (INHALATION) EVERY 30 MIN PRN
Status: DISCONTINUED | OUTPATIENT
Start: 2025-07-10 | End: 2025-07-10 | Stop reason: HOSPADM

## 2025-07-10 RX ORDER — HYDROMORPHONE HYDROCHLORIDE 0.2 MG/ML
0.2 INJECTION INTRAMUSCULAR; INTRAVENOUS; SUBCUTANEOUS EVERY 5 MIN PRN
Status: DISCONTINUED | OUTPATIENT
Start: 2025-07-10 | End: 2025-07-10 | Stop reason: HOSPADM

## 2025-07-10 RX ORDER — CEFAZOLIN SODIUM 2 G/100ML
2 INJECTION, SOLUTION INTRAVENOUS ONCE
Status: DISCONTINUED | OUTPATIENT
Start: 2025-07-10 | End: 2025-07-10 | Stop reason: HOSPADM

## 2025-07-10 RX ORDER — LABETALOL HYDROCHLORIDE 5 MG/ML
5 INJECTION, SOLUTION INTRAVENOUS EVERY 5 MIN PRN
Status: DISCONTINUED | OUTPATIENT
Start: 2025-07-10 | End: 2025-07-10 | Stop reason: HOSPADM

## 2025-07-10 RX ORDER — SODIUM CHLORIDE, SODIUM LACTATE, POTASSIUM CHLORIDE, CALCIUM CHLORIDE 600; 310; 30; 20 MG/100ML; MG/100ML; MG/100ML; MG/100ML
40 INJECTION, SOLUTION INTRAVENOUS CONTINUOUS
Status: DISCONTINUED | OUTPATIENT
Start: 2025-07-10 | End: 2025-07-10 | Stop reason: HOSPADM

## 2025-07-10 RX ORDER — ROCURONIUM BROMIDE 10 MG/ML
INJECTION, SOLUTION INTRAVENOUS AS NEEDED
Status: DISCONTINUED | OUTPATIENT
Start: 2025-07-10 | End: 2025-07-10

## 2025-07-10 RX ORDER — CEPHALEXIN 500 MG/1
500 CAPSULE ORAL 2 TIMES DAILY
Qty: 14 CAPSULE | Refills: 0 | Status: SHIPPED | OUTPATIENT
Start: 2025-07-10 | End: 2025-07-17

## 2025-07-10 RX ORDER — SODIUM CHLORIDE, SODIUM LACTATE, POTASSIUM CHLORIDE, CALCIUM CHLORIDE 600; 310; 30; 20 MG/100ML; MG/100ML; MG/100ML; MG/100ML
20 INJECTION, SOLUTION INTRAVENOUS CONTINUOUS
Status: DISCONTINUED | OUTPATIENT
Start: 2025-07-10 | End: 2025-07-10 | Stop reason: HOSPADM

## 2025-07-10 RX ORDER — PHENAZOPYRIDINE HYDROCHLORIDE 200 MG/1
200 TABLET, FILM COATED ORAL 3 TIMES DAILY PRN
Qty: 30 TABLET | Refills: 0 | Status: SHIPPED | OUTPATIENT
Start: 2025-07-10

## 2025-07-10 RX ORDER — ONDANSETRON HYDROCHLORIDE 2 MG/ML
4 INJECTION, SOLUTION INTRAVENOUS ONCE AS NEEDED
Status: DISCONTINUED | OUTPATIENT
Start: 2025-07-10 | End: 2025-07-10 | Stop reason: HOSPADM

## 2025-07-10 RX ORDER — LIDOCAINE HYDROCHLORIDE 20 MG/ML
INJECTION, SOLUTION INFILTRATION; PERINEURAL AS NEEDED
Status: DISCONTINUED | OUTPATIENT
Start: 2025-07-10 | End: 2025-07-10

## 2025-07-10 RX ORDER — ONDANSETRON HYDROCHLORIDE 2 MG/ML
INJECTION, SOLUTION INTRAVENOUS AS NEEDED
Status: DISCONTINUED | OUTPATIENT
Start: 2025-07-10 | End: 2025-07-10

## 2025-07-10 RX ORDER — TRANEXAMIC ACID 1 G/10ML
INJECTION, SOLUTION INTRAVENOUS AS NEEDED
Status: DISCONTINUED | OUTPATIENT
Start: 2025-07-10 | End: 2025-07-10

## 2025-07-10 RX ORDER — IPRATROPIUM BROMIDE 0.5 MG/2.5ML
500 SOLUTION RESPIRATORY (INHALATION) EVERY 30 MIN PRN
Status: DISCONTINUED | OUTPATIENT
Start: 2025-07-10 | End: 2025-07-10 | Stop reason: HOSPADM

## 2025-07-10 RX ORDER — FENTANYL CITRATE 50 UG/ML
50 INJECTION, SOLUTION INTRAMUSCULAR; INTRAVENOUS EVERY 5 MIN PRN
Status: DISCONTINUED | OUTPATIENT
Start: 2025-07-10 | End: 2025-07-10 | Stop reason: HOSPADM

## 2025-07-10 RX ORDER — CEFAZOLIN 1 G/1
INJECTION, POWDER, FOR SOLUTION INTRAVENOUS AS NEEDED
Status: DISCONTINUED | OUTPATIENT
Start: 2025-07-10 | End: 2025-07-10

## 2025-07-10 RX ORDER — MIDAZOLAM HYDROCHLORIDE 1 MG/ML
INJECTION, SOLUTION INTRAMUSCULAR; INTRAVENOUS AS NEEDED
Status: DISCONTINUED | OUTPATIENT
Start: 2025-07-10 | End: 2025-07-10

## 2025-07-10 RX ADMIN — MIDAZOLAM 2 MG: 1 INJECTION INTRAMUSCULAR; INTRAVENOUS at 06:56

## 2025-07-10 RX ADMIN — ROCURONIUM BROMIDE 50 MG: 10 INJECTION, SOLUTION INTRAVENOUS at 07:03

## 2025-07-10 RX ADMIN — SODIUM CHLORIDE, POTASSIUM CHLORIDE, SODIUM LACTATE AND CALCIUM CHLORIDE: 600; 310; 30; 20 INJECTION, SOLUTION INTRAVENOUS at 06:56

## 2025-07-10 RX ADMIN — DEXAMETHASONE SODIUM PHOSPHATE 8 MG: 4 INJECTION INTRA-ARTICULAR; INTRALESIONAL; INTRAMUSCULAR; INTRAVENOUS; SOFT TISSUE at 07:06

## 2025-07-10 RX ADMIN — ROCURONIUM BROMIDE 20 MG: 10 INJECTION, SOLUTION INTRAVENOUS at 07:24

## 2025-07-10 RX ADMIN — FENTANYL CITRATE 100 MCG: 50 INJECTION, SOLUTION INTRAMUSCULAR; INTRAVENOUS at 07:00

## 2025-07-10 RX ADMIN — ONDANSETRON 4 MG: 2 INJECTION, SOLUTION INTRAMUSCULAR; INTRAVENOUS at 08:05

## 2025-07-10 RX ADMIN — TRANEXAMIC ACID 1000 MG: 100 INJECTION INTRAVENOUS at 07:06

## 2025-07-10 RX ADMIN — LIDOCAINE HYDROCHLORIDE 60 MG: 20 INJECTION, SOLUTION INFILTRATION; PERINEURAL at 07:01

## 2025-07-10 RX ADMIN — SUGAMMADEX 200 MG: 100 INJECTION, SOLUTION INTRAVENOUS at 08:08

## 2025-07-10 RX ADMIN — CEFAZOLIN 2 G: 330 INJECTION, POWDER, FOR SOLUTION INTRAMUSCULAR; INTRAVENOUS at 07:06

## 2025-07-10 RX ADMIN — PROPOFOL 200 MG: 10 INJECTION, EMULSION INTRAVENOUS at 07:02

## 2025-07-10 SDOH — HEALTH STABILITY: MENTAL HEALTH: CURRENT SMOKER: 0

## 2025-07-10 ASSESSMENT — PAIN SCALES - GENERAL

## 2025-07-10 ASSESSMENT — PAIN - FUNCTIONAL ASSESSMENT

## 2025-07-10 ASSESSMENT — PAIN DESCRIPTION - DESCRIPTORS
DESCRIPTORS: OTHER (COMMENT)

## 2025-07-10 NOTE — ANESTHESIA POSTPROCEDURE EVALUATION
"Patient: Abhijeet Broderick \"Jose\"    Procedure Summary       Date: 07/10/25 Room / Location: [facility] 02 / Virtual [facility]    Anesthesia Start: 0656 Anesthesia Stop: 0823    Procedure: HoLEP 90 minutes (Morcellator, HoLEP set,Holmium,P120) Diagnosis:       Benign prostatic hyperplasia with urinary retention      (Benign prostatic hyperplasia with urinary retention [N40.1, R33.8])    Surgeons: Ginna Merino MD Responsible Provider: Deon Ludwig DO    Anesthesia Type: general ASA Status: 2            Anesthesia Type: general    Vitals Value Taken Time   /91 07/10/25 09:40   Temp 36.4 °C (97.5 °F) 07/10/25 09:40   Pulse 60 07/10/25 09:37   Resp 17 07/10/25 09:37   SpO2 98 % 07/10/25 09:37   Vitals shown include unfiled device data.    Anesthesia Post Evaluation    Patient location during evaluation: bedside  Patient participation: complete - patient participated  Level of consciousness: awake and alert  Pain management: adequate  Multimodal analgesia pain management approach  Airway patency: patent  Two or more strategies used to mitigate risk of obstructive sleep apnea  Cardiovascular status: acceptable  Respiratory status: acceptable  Hydration status: acceptable  Postoperative Nausea and Vomiting: none        No notable events documented.    "

## 2025-07-10 NOTE — PROGRESS NOTES
[facility]  Outpatient Clinic Note    Patient Name:  Abhijeet Broderick  MRN:  [id number]  :  1955  Date of Service: 2025     Visit type: Follow up visit    HPI    Interval History:  Abhijeet Broderick is a 70 y.o. male who is being seen today for  problems listed below.     Problem list/Chief complaints:  BPH with LUTS/urinary retention  Elevated PSA       (25) visit with Dr. Merino  Abhijeet Broderick is a 70 y.o. male presenting as a new patient with history of BPH (78g) with LUTS, up-trending PSA. Referred to me to discuss options for his worsening LUTS despite tamsulosin 0.4 mg daily. Noted to be retaining urine, post-void residual: 681 mL. IO urinalysis showed no blood or infection, +trace leuks. In addition, states he recently had a PSA that was acutely elevated compared to prior in May 2024. Expressed interest in an outlet procedure for definitive treatment for his BPH with urinary retention.      He was admitted in 2025 for sick sinus syndrome with symptomatic bradycardia s/p pacemaker placement (25). Followed and managed by cardiology.      Renal function:  GFR >90, Cr 0.88 (25)     PSA summary:  3.32 ~ May 2024  3.92 ~ 2023  3.69 ~ 2022  2.08 ~ 2021  1.66 ~ 2020     Prostate MRI (23) reviewed.   Prostate weight: 78g.  PSA density: 0.05 ng/mL/g.   PI-RADS 2 only.   No extracapsular extension.   No pelvic lymphadenopathy.    35: Visit with Dr. Merino. Abhijeet Broderick is a 70 y.o. male with history of BPH with LUTS/urinary retention and elevated PSA; presenting today for cystoscopy in anticipation of an outlet procedure. Patient was started on CIC for his retention.   I personally and independently reviewed images of the prostate MRI from 23 which showed a  78g prostate with no evidence of cancer.   I recommended proceeding with HoLEP.    25: Patient presents for follow up and TOV. He has been doing well since surgery. Urine  is clear and yellow draining into catheter bag. Pain has been managed with oral pain medications. He is eating and drinking with no issues, he has normal bowel movements. He denies any fevers or chills. He is ambulating at baseline. TOV passed, PVR 0 ml.    Medical History[1]    Surgical History[2]    Social History     Socioeconomic History    Marital status:      Spouse name: Not on file    Number of children: Not on file    Years of education: Not on file    Highest education level: Not on file   Occupational History    Not on file   Tobacco Use    Smoking status: Never     Passive exposure: Never    Smokeless tobacco: Never   Substance and Sexual Activity    Alcohol use: Never    Drug use: Never    Sexual activity: Not Currently     Partners: Female   Other Topics Concern    Not on file   Social History Narrative    Not on file     Social Drivers of Health     Financial Resource Strain: Low Risk  (1/30/2025)    Overall Financial Resource Strain (CARDIA)     Difficulty of Paying Living Expenses: Not hard at all   Food Insecurity: No Food Insecurity (1/30/2025)    Hunger Vital Sign     Worried About Running Out of Food in the Last Year: Never true     Ran Out of Food in the Last Year: Never true   Transportation Needs: No Transportation Needs (1/30/2025)    PRAPARE - Transportation     Lack of Transportation (Medical): No     Lack of Transportation (Non-Medical): No   Physical Activity: Not on File (3/11/2021)    Received from [employer].    Physical Activity     Physical Activity: 0   Stress: Not on File (3/11/2021)    Received from [employer].    Stress     Stress: 0   Social Connections: Not on File (3/11/2021)    Received from [employer].    Social Connections     Social Connections and Isolation: 0   Intimate Partner Violence: Not At Risk (1/30/2025)    Humiliation, Afraid, Rape, and Kick questionnaire     Fear of Current or Ex-Partner: No     Emotionally Abused: No     Physically Abused: No     Sexually Abused: No    Housing Stability: Low Risk  (1/30/2025)    Housing Stability Vital Sign     Unable to Pay for Housing in the Last Year: No     Number of Times Moved in the Last Year: 0     Homeless in the Last Year: No       Allergies[3]     Current Medications[4]     Review of system:  All other systems have been reviewed and are negative for complaints      Last recorded vitals:  There were no vitals taken for this visit.    Physical Exam:  General: Appears comfortable and in no apparent distress.  Head: Normocephalic, atraumatic  Eyes: Non-injected conjunctiva, sclera clear, no proptosis  Lungs: Breathing is easy, non-labored. Speaking in clear and complete sentences. Normal diaphragmatic movement.  Cardiovascular: no peripheral edema, cyanosis or pallor.   Abdomen: soft, non-distended, non-tender  : Bladder: non tender, not distended  MSK: Ambulatory with steady gait, unassisted  Skin: No visible rashes or lesions  Neurologic: Alert, oriented to person, place, and time  Psychiatric: mood and affect appropriate      Imaging  MR prostate with lauren boundaries if pirads 3 or above  Narrative: Interpreted By:  Joaquim Peacock,  and Rachel Bustos   STUDY:  MR PROSTATE WITH LAUREN BOUNDARIES IF PIRADS 3 OR ABOVE;  7/8/2025 11:04  am      INDICATION:  Signs/Symptoms:bph wtih urinary retention, elevated PSA, prostate  volume for surgical planning, fusion if abnormal.  PSA 3.32 5/20/2024.      ,N40.0 Benign prostatic hyperplasia without lower urinary tract  symptoms,R97.20 Elevated prostate specific antigen (PSA),N40.1 Benign  prostatic hyperplasia with lower urinary tract symptoms,R33.8 Other  retention of urine      COMPARISON:  MR prostate 06/08/2023.      ACCESSION NUMBER(S):  [id number]      ORDERING CLINICIAN:  ELISSA GILL      TECHNIQUE:  Multiplanar MRI of the pelvis was obtained including axial, sagittal  and coronal T2 weighted SSFSE, axial and sagittal T2 FSE, axial DWI,  pre and post gadolinium dynamic T1 GRE  sequences. Multiparametric  analysis was performed.      19 ML of Dotarem was administered intravenously without immediate  complications.      FINDINGS:  PROSTATE VOLUME:  The prostate measures  5.3 cm x  4.9 cm  x  6.5 cm in right-to-left,  anterior-posterior and craniocaudal dimension.      Prostate weight is estimated at 88.39g. PSA density is 0.04 ng/mL/g.      PROSTATE PARENCHYMA:  There is heterogeneous enlargement of the transition zone, consistent  with benign prostatic hyperplasia. There is a T2 hypointense nodule  which is encapsulated and shows mild high signal on high B value DWI  images and moderate hypointense signal on ADC images likely  representing atypical BPH nodule. The peripheral zone is diffusely  heterogenous on T2WI, with bilateral polygonal and wedge-shaped  T2-hypointense areas, that show no signs of abnormally restricted  diffusion (PI-RADS 2). EXTRACAPSULAR EXTENSION:  None.      SEMINAL VESICLES:  Within normal limits.      PELVIC LYMPH NODES:  No abnormally enlarged pelvic lymph nodes are identified.      PERITONEUM:  No free or loculated fluid collections are evident in the pelvis.      OTHER ORGANS:  Within normal limits.      BONES:  No focal lesions are noted in the bone.      Exam Quality:  Is T2WI weighted imaging of diagnostic quality:  Yes. T2WI  assessment:  Optimal. Is DWI of diagnostic quality:  Yes. DWI  assessment:  Optimal. Is DCE of diagnostic quality:  Yes. DCE  assessment:  Optimal. PI-QUAL score:  All sequences are of optimal  diagnostic quality Comments:  None.      Impression: BPH changes of the transition zone. Diffuse non nodular  hypointensities within the peripheral zone, without evidence of  focally restricted diffusion (PI-RADS 2).      PI-RADS 2 - Low (clinically significant cancer is unlikely to be  present).      I personally reviewed the images/study and I agree with the findings  as stated by Juli Ramos MD. This study was interpreted  at  [place], [place].      MACRO:  None      Signed by: Joaquim Peacock 7/8/2025 3:26 PM  Dictation workstation:   [id number]    Cystoscopy findings: 6/24/25  Urethra: normal course and caliber, no evidence of stricture or lesion.  Prostate: non-obstructing.   Bladder: large capacity, no trabeculations, no diverticulum, no stone, tumors or other lesions.  Post-cystoscopy: Patient tolerated procedure without complications.     [x] Lateral hypertrophy, with complete channel occlusion.  [] Obstructing median lobe with intravesical protrusion.  [x] Good sphincter coaptation.  [] Normal bladder.        Labs  Pre-Admission Testing on 06/26/2025   Component Date Value    Ventricular Rate 06/26/2025 60     Atrial Rate 06/26/2025 60     AR Interval 06/26/2025 228     QRS Duration 06/26/2025 82     QT Interval 06/26/2025 402     QTC Calculation(Bazett) 06/26/2025 402     P Axis 06/26/2025 44     R Blissfield 06/26/2025 -3     T Blissfield 06/26/2025 17     QRS Count 06/26/2025 10     Q Onset 06/26/2025 218     P Onset 06/26/2025 117     P Offset 06/26/2025 179     T Offset 06/26/2025 419     QTC Fredericia 06/26/2025 402        Assessment and Plan:  Abhijeet Broderick is a 70 y.o. male with history of elevated PSA, BPH with LUTS s/p HOLEP on 7/10/25 with Dr. Merino, who presents for POV and TOV.     We discussed postoperative urinary retention in great detail. We discussed that different medications, including anesthesia can influence urinary retention. We discussed that postoperative constipation can also contribute to urinary retention. We discussed management of urinary retention to include indwelling catheter or CIC. We discussed risks of unmanaged urinary retention including irreversible kidney injury and increased risk of UTI. We discussed TOV today.    Plan:  - TOV today passed  - PVR 0 ml  - Patient encouraged to drink plenty of fluids to maintain hydration and clear urine  output  - Discussed that they may have some irritative voiding symptoms over the next few days: burning, frequency, urgency  - Activity restrictions reviewed, discussed post op expectations  - Patient instructed to go to ED if unable to void in 4-6 hr or unable to void with urge  -PSA ordered in 3 months to establish new baseline  -Follow-up as scheduled on 10/14 with Dr. Merino, or sooner if needed, to reassess symptoms and for medication refill.    All questions and concerns were addressed. Patient verbalizes understanding and has no other questions at this time.     Some elements copied from Dr. Merino's note on 6/24/25, the elements have been updated and all reflect current decision making from today, 07/11/25    E&M visit today is associated with current or anticipated ongoing medical care services related to a patient's single, serious condition or a complex condition.    MARTIN Navarro-CNP   [facility]  07/11/25 9:43 AM         [1]   Past Medical History:  Diagnosis Date    BPH (benign prostatic hyperplasia)     Other injury of unspecified body region, initial encounter 11/23/2020    Wound of skin    Pacemaker     Personal history of diseases of the blood and blood-forming organs and certain disorders involving the immune mechanism 01/10/2017    History of leukocytosis    Personal history of diseases of the skin and subcutaneous tissue 01/06/2017    History of eczema    Personal history of diseases of the skin and subcutaneous tissue 02/18/2019    History of actinic keratosis    Unspecified acute conjunctivitis, bilateral 02/18/2019    Conjunctivitis, acute, bilateral   [2]   Past Surgical History:  Procedure Laterality Date    BACK SURGERY      CARDIAC ELECTROPHYSIOLOGY PROCEDURE N/A 1/30/2025    Procedure: PPM IMPLANT DUAL;  Surgeon: Edmund Garcia MD;  Location: [facility];  Service: Electrophysiology;  Laterality: N/A;    HERNIA REPAIR  2010    WISDOM TOOTH EXTRACTION      WRIST  FRACTURE SURGERY Right    [3] No Known Allergies  [4]   Current Outpatient Medications:     cephalexin (Keflex) 500 mg capsule, Take 1 capsule (500 mg) by mouth 2 times a day for 7 days., Disp: 14 capsule, Rfl: 0    halobetasol (UltraVATE) 0.05 % cream, APPLY TO AFFECTED AREA ON BODY TWICE DAILY FOR 2 WEEKS AS NEEDED FOR FLARES. DO NOT USE ON FACE (Patient not taking: Reported on 5/6/2025), Disp: , Rfl:     losartan (Cozaar) 25 mg tablet, Take 1 tablet (25 mg) by mouth once daily., Disp: 90 tablet, Rfl: 1    phenazopyridine (Pyridium) 200 mg tablet, Take 1 tablet (200 mg) by mouth 3 times a day as needed for bladder spasms for up to 30 doses., Disp: 30 tablet, Rfl: 0  No current facility-administered medications for this visit.

## 2025-07-10 NOTE — ANESTHESIA PROCEDURE NOTES
Airway  Date/Time: 7/10/2025 7:05 AM  Reason: elective    Airway not difficult    Staffing  Performed: CRNA   Authorized by: Deon Ludwig DO    Performed by: MARTIN Rayo-STEFFEN  Patient location during procedure: OR    Patient Condition  Indications for airway management: anesthesia  Sedation level: deep     Final Airway Details   Preoxygenated: yes  Final airway type: endotracheal airway  Successful airway: ETT  Cuffed: yes   Successful intubation technique: direct laryngoscopy  Endotracheal tube insertion site: oral  Blade: Tamela  Blade size: #4  ETT size (mm): 7.5  Cormack-Lehane Classification: grade I - full view of glottis  Placement verified by: chest auscultation and capnometry   Measured from: lips  ETT to lips (cm): 22  Number of attempts at approach: 1

## 2025-07-10 NOTE — OP NOTE
"HoLEP 90 minutes (Morcellator, HoLEP set,Holmium,P120) Operative Note     Date: 7/10/2025  OR Location: [facility]    Name: Abhijeet Broderick \"Jose\", : 1955, Age: 70 y.o., MRN: [id number], Sex: male    Diagnosis  Pre-op Diagnosis      * Benign prostatic hyperplasia with urinary retention [N40.1, R33.8] Post-op Diagnosis     * Benign prostatic hyperplasia with urinary retention [N40.1, R33.8]     Procedures  HoLEP 90 minutes (Morcellator, HoLEP set,Holmium,P120)  06475 - [place] LASER ENUCLEATION PROSTATE W/MORCELLATION      Surgeons      * Ginna Merino - Primary    Resident/Fellow/Other Assistant:  Surgeons and Role:  * No surgeons found with a matching role *    Staff:   Circulator: Micha  Circulator: Candelaria Shirley Person: Brooke    Anesthesia Staff: Anesthesiologist: Deon Ludwig DO  CRNA: MARTIN Rayo-CRNA  Frontline Breaker: SOPHIE Chavez    Procedure Summary  Anesthesia: General  ASA: II  Estimated Blood Loss: 5mL  Intra-op Medications:   Administrations occurring from 0650 to 0850 on 07/10/25:   Medication Name Total Dose   ceFAZolin (Ancef) vial 1 g 2 g   dexAMETHasone (Decadron) 4 mg/mL IV Syringe 2 mL 8 mg   fentaNYL (Sublimaze) injection 50 mcg/mL 100 mcg   lactated Ringer's infusion Cannot be calculated   lidocaine (Xylocaine) injection 2 % 60 mg   midazolam (Versed) injection 1 mg/mL 2 mg   ondansetron (Zofran) 2 mg/mL injection 4 mg   propofol (Diprivan) injection 10 mg/mL 200 mg   rocuronium (ZeMuron) 50 mg/5 mL injection 70 mg   sugammadex (Bridion) 200 mg/2 mL injection 200 mg   tranexamic acid (Cyklokapron) injection 1,000 mg              Anesthesia Record               Intraprocedure I/O Totals          Intake    Tranexamic Acid 0.00 mL    The total shown is the total volume documented since Anesthesia Start was filed.    Total Intake 0 mL          Specimen:   ID Type Source Tests Collected by Time   1 : prostate tissue Tissue PROSTATE HOLEP SURGICAL PATHOLOGY EXAM " "Ginna Merino MD 7/10/2025 0722                 Drains and/or Catheters:   Urethral Catheter Coude;Other (Comment) 22 Fr. (Active)       Tourniquet Times:         Implants:     Findings: BPH    Indications: Abhijeet Broderick \"Lamar" is an 70 y.o. male who is having surgery for Benign prostatic hyperplasia with urinary retention [N40.1, R33.8].     The patient was seen in the preoperative area. The risks, benefits, complications, treatment options, non-operative alternatives, expected recovery and outcomes were discussed with the patient. The possibilities of reaction to medication, pulmonary aspiration, injury to surrounding structures, bleeding, recurrent infection, the need for additional procedures, failure to diagnose a condition, and creating a complication requiring transfusion or operation were discussed with the patient. The patient concurred with the proposed plan, giving informed consent.  The site of surgery was properly noted/marked if necessary per policy. The patient has been actively warmed in preoperative area. Preoperative antibiotics have been ordered and given within 1 hours of incision. Venous thrombosis prophylaxis have been ordered including bilateral sequential compression devices    Procedure Details: Preoperative diagnosis: BPH with LUTs    Postoperative diagnosis: same    Procedure: Holmium laser enucleation of prostate and tissue morcellation    Anesthesia: general    IVF: see anesthesia report    EBL: 5 ml    Complications: none    Catheters: 22 Fr 3-way Osman catheter    Specimen: prostate chips    Disposition: PACU in stable condition       Enucleation time: 17  Total laser time: 30  Total energy used: 127,760       Patient is a 70 year-old male with significant obstructive and irritative voiding symptoms not responsive to maximal medical therapy.  Ambulatory evaluation demonstrated him to be a good candidate for HoLEP procedure.  Risks and alternatives were discussed in great detail, he " singed the informed consent and agreed to proceed    50 ml of lubricant were injected to the urethra.  Urethral meatus was dilated with repeat sounds to 30 Fr caliber    A 26 Armenian Feldman resectoscope was inserted.  The anterior urethra was normal, there was good coaptation of the membranous urethra, there was a large obstructing prostate.  The bladder was normal without stones or lesions.  Both ureteral orifices were identified.    We started the enucleation using a 550 µm holmium laser fiber.    A circumferential incision was made in the urethra proximal to the sphincter.  It was deepened anteriorly and laterally.  We then entered the space between the capsule and the adenoma bluntly lateral to the verumontanum.  This was done bilaterally.  The posterior plane was developed bilaterally and connected by cutting the fibers proximal to the verumontanum.  The resection was carried as far proximally as possible.    We then returned to the initial incision in the urethra and detached the lateral attachments between the sphincter and the adenoma.  We were able to identify the lateral plane and developed it as proximally as possible by connecting it to the posterior plane.    We then turned to free the anterior portion of the sphincter.  The attachments between the sphincter and the anterior adenoma were taken down with the laser fiber until we met the anterior plan.  We then connected all planes circumferentially.  We continued the enucleation circumferentially until we reached the bladder neck.  The bladder neck was entered anteriorly and good hemostasis was obtained.  Then the bladder neck incision was developed circumferentially around the adenoma.  Adenoma was then rolled into the bladder and residual posterior attachments were removed.    Hemostasis was performed.The laser bridge was removed and the nephroscope loaded with the Piranha morcellator was inserted.  2 irrigations were connected to distended bladder.   Tissue was completely morcellated.    The laser bridge was inserted again and meticulous hemostasis was performed.  I verified that there was no residual tissue in the bladder, and that ureteral orifices were free.    The laser apparatus was removed and a 22 Urdu three-way catheter was inserted and connected to irrigation.    Patient was extubated and moved to the postoperative area in stable condition.         Disposition: patient will have a trial of void on postoperative day one    Evidence of Infection: No   Complications:  None; patient tolerated the procedure well.    Disposition: PACU - hemodynamically stable.  Condition: stable                 Additional Details:     Attending Attestation: I was present and scrubbed for the entire procedure.    Ginna Merino  Phone Number: [phone]

## 2025-07-10 NOTE — DISCHARGE INSTRUCTIONS
Diet  You can eat whatever you like after your surgery. Sometimes the anesthesia can cause nausea, so it may be a good idea to stay away from heavy foods right after you get home from the procedure.    Abbasi catheter  You will have a tube in the bladder called a abbasi catheter. This drains urine from the bladder and exits the penis. Be sure the catheter is well secured to the leg at all times. This catheter typically stays in from one day to a week (7 days) depending on your circumstances. There should never be any tension or tugging on the catheter. Take care not to pull on the catheter when rolling in bed or changing position. The nurses will show you how to attach the abbasi catheter to a leg bag during the day and a big bag at night.    The abbasi catheter has a balloon on the end of it to keep it in place in the bladder. This may give you the feeling you need to urinate. Be assured the catheter is draining and the sensation is from the abbasi catheter balloon. You may also notice urine or blood-tinged urine leaking around the catheter out the tip of the penis. Typically, this due to a bladder spasm and is not a cause for alarm. If the catheter stops draining, get up and walk around. If it is still not draining, call the office or come to the emergency room as it may be clogged and need to be irrigated. Once the abbasi catheter is removed, it is normal to have burning and stinging with urination for a few weeks after surgery. It is also common to have more frequent urination and a greater sense of the urge to urinate. There may not be much warning from the time you feel the urge to urinate to the time when the bladder is ready to empty.    Activity  It is very important to walk after your procedure. Walking prevents blood clots in the legs or lungs. You may go up and down stairs. Avoid any strenuous activity or lifting more than ten pounds for 3 to 4 weeks. This includes any heavy lifting, running, riding a bicycle  or golf. This also includes activities such as raking leaves, mowing the lawn, shoveling snow or other strenuous chores. If you see blood in the urine, increase the amount of water you are drinking and avoid strenuous activity or heavy lifting until the blood clears.    Medications  Take the medications prescribed at the time of your discharge from the hospital. If you are taking any medications on a regular basis prior to your admission to the hospital, you should continue to take those as well. For any aches, pains or headaches, you may use Tylenol or Extra-Strength Tylenol. Sometimes, you will be given a prescription for other pain killers. Do not use any aspirin or aspirin-like compounds or ibuprofen products (NSAIDs) (eg. Advil, Nuprin, Motrin, Bufferin, etc.) for four weeks after surgery. If you start aspirin or aspirin like compounds and you notice blood in your urine, please stop taking it and increase your water intake. Pyridium will be called in that will help with burning.  It will color your urine orange.  Antibiotic will be also prescribed for 1 week.    Avoid constipation  Anesthesia, surgery and narcotic pain medication all increase your risk for constipation. Do not strain to move the bowels as this can impair the healing process and start bleeding. Take plenty of fiber, water and over the counter stool softener to avoid constipation. Stool softener can be taken by mouth twice a day to avoid constipation. A stool softener or laxative is available at any drug store without a prescription (senna or Senokot or SennaGen, Dulcolax or bisacodyl, Miralax, Metamucil, Milk of Magnesia or magnesium hydroxide). Decrease or hold the stool softener for diarrhea or loose stools.    Follow up plan  If you develop a fever greater than 101 degrees Fahrenheit, the catheter stops draining or you are unable to urinate, call the office or come to the emergency room.  Your catheter removal has been scheduled  Please call  [phone] and follow prompts for Dr. Merino's  if you are not sure of your appointment time or location

## 2025-07-10 NOTE — PERIOPERATIVE NURSING NOTE
"0821 Arrives to pacu 21 sfm 6 liters good rise and fall of chest and fogging of mask.l IV patent. Warm blankets applied to patient. Color pin skin warm and dry. Abbasi secured to left thigh with securing device gauze wrap at meatus abbasi draining light cherry clear urine. Bilateral sequentials on able to bend knees and flex ankles on commands. Denies pain and nausea.    0834 Resting quietly \" I don't feel anything not even the urge to urinate\" Abbasi draining dark pink clear.    0900 Resting denies pain and pressure abbasi patent with irrigation  color similar  to grapefruit juice.    0921 Catheter irrigation stopped and plugged.    0930 Abbasi return pink tinged clear with no clots. Patient drinking beverage  No pain or nausea,  paced. SDS updated on patient progress.    0942 To SDS. Urine clear. No nausea no pain.  "

## 2025-07-10 NOTE — NURSING NOTE
Received report from PACU and patient brought over to phase-2.  Assessment complete.  Assisted patient with getting dressed.  Reviewed discharge instructions with patient and family.  Educated patient and family on how to care for catheter.  Catheter supplies given to patient.  All questions answered at this time.     Patient meets criteria  for discharge. IV discontinued by RN and patient taken to car via wheelchair

## 2025-07-10 NOTE — ANESTHESIA PREPROCEDURE EVALUATION
"Patient: Abhijeet Broderick \"Jose\"    Procedure Information       Date/Time: 07/10/25 0650    Procedure: HoLEP 90 minutes (Morcellator, HoLEP set,Holmium,P120)    Location: [facility] / [facility]    Surgeons: Ginna Merino MD            Relevant Problems   Cardiac   (+) Heart block   (+) Hypertriglyceridemia   (+) Pacemaker      /Renal   (+) Benign prostatic hyperplasia with nocturia   (+) Benign prostatic hyperplasia with urinary retention       Clinical information reviewed:    Allergies  Meds               NPO Detail:  NPO/Void Status  Date of Last Liquid: 07/09/25  Time of Last Liquid: 2100  Date of Last Solid: 07/09/25  Time of Last Solid: 2100  Time of Last Void: 0605         Physical Exam    Airway  Mallampati: II  TM distance: >3 FB  Neck ROM: full     Cardiovascular Comments: deferred   Dental   Comments: No loose teeth     Pulmonary Comments: deferred   Abdominal   Comments: deferred           Anesthesia Plan    History of general anesthesia?: yes  History of complications of general anesthesia?: no    ASA 2     general     The patient is not a current smoker.  Patient was not previously instructed to abstain from smoking on day of procedure.  Patient did not smoke on day of procedure.  Education provided regarding risk of obstructive sleep apnea.  intravenous induction   Postoperative pain plan includes opioids.  Anesthetic plan and risks discussed with patient.  Use of blood products discussed with patient who consented to blood products.    Plan discussed with CRNA and CAA.      "

## 2025-07-11 ENCOUNTER — OFFICE VISIT (OUTPATIENT)
Dept: UROLOGY | Facility: HOSPITAL | Age: 70
End: 2025-07-11
Payer: COMMERCIAL

## 2025-07-11 DIAGNOSIS — Z48.89 POSTOPERATIVE VISIT: ICD-10-CM

## 2025-07-11 DIAGNOSIS — N40.1 BENIGN PROSTATIC HYPERPLASIA WITH URINARY RETENTION: Primary | ICD-10-CM

## 2025-07-11 DIAGNOSIS — R33.8 BENIGN PROSTATIC HYPERPLASIA WITH URINARY RETENTION: Primary | ICD-10-CM

## 2025-07-11 PROCEDURE — 99213 OFFICE O/P EST LOW 20 MIN: CPT | Performed by: NURSE PRACTITIONER

## 2025-07-11 PROCEDURE — 51798 US URINE CAPACITY MEASURE: CPT | Performed by: NURSE PRACTITIONER

## 2025-07-11 NOTE — PROGRESS NOTES
Pt is here today for a trial of void, name and date of birth was verified. Procedure was explained to pt, and understood. Pt tolerated 300cc of sterile saline infused through urinary catheter without difficulty. Urinary catheter was removed and patient voided 400 cc. A PVR scan was done and showed 0mL of urine left in the bladder.   It was explained to pt that if unable to urinate to go to the emergency room. Pt was told and understood to drink plenty of fluids to keep the bladder stimulated. If there were any questions or concerns pt understood --- he/she is to call the office.

## 2025-07-22 LAB
LABORATORY COMMENT REPORT: NORMAL
PATH REPORT.FINAL DX SPEC: NORMAL
PATH REPORT.GROSS SPEC: NORMAL
PATH REPORT.RELEVANT HX SPEC: NORMAL
PATH REPORT.TOTAL CANCER: NORMAL

## 2025-09-02 ENCOUNTER — APPOINTMENT (OUTPATIENT)
Dept: CARDIOLOGY | Facility: CLINIC | Age: 70
End: 2025-09-02
Payer: COMMERCIAL

## 2025-09-02 ASSESSMENT — COLUMBIA-SUICIDE SEVERITY RATING SCALE - C-SSRS
2. HAVE YOU ACTUALLY HAD ANY THOUGHTS OF KILLING YOURSELF?: NO
6. HAVE YOU EVER DONE ANYTHING, STARTED TO DO ANYTHING, OR PREPARED TO DO ANYTHING TO END YOUR LIFE?: NO
1. IN THE PAST MONTH, HAVE YOU WISHED YOU WERE DEAD OR WISHED YOU COULD GO TO SLEEP AND NOT WAKE UP?: NO

## 2025-09-10 ENCOUNTER — APPOINTMENT (OUTPATIENT)
Dept: CARDIOLOGY | Facility: CLINIC | Age: 70
End: 2025-09-10
Payer: COMMERCIAL

## 2025-10-14 ENCOUNTER — APPOINTMENT (OUTPATIENT)
Dept: UROLOGY | Facility: CLINIC | Age: 70
End: 2025-10-14
Payer: COMMERCIAL

## 2025-11-06 ENCOUNTER — APPOINTMENT (OUTPATIENT)
Dept: PRIMARY CARE | Facility: CLINIC | Age: 70
End: 2025-11-06
Payer: COMMERCIAL

## 2026-09-04 ENCOUNTER — APPOINTMENT (OUTPATIENT)
Dept: CARDIOLOGY | Facility: CLINIC | Age: 71
End: 2026-09-04
Payer: COMMERCIAL

## (undated) DEVICE — Device

## (undated) DEVICE — GOWN, SURGICAL, SIRUS, NON REINFORCED, LARGE

## (undated) DEVICE — TUBING, ELLIK, FOR ELLIK/TOOMEY ADAPTERS

## (undated) DEVICE — GLOVE, PROTEXIS PI CLASSIC, SZ-6.5, PF, LF

## (undated) DEVICE — SYRINGE, TOOMEY, IRRIGATION, 60ML, INDIVIDUAL WRAP, STERILE

## (undated) DEVICE — SYRINGE, 20 CC, LUER LOCK

## (undated) DEVICE — CATHETER, FOLEY, 3WAY, 22FR, 30CC, HEMATURIA, W/COUDE TIP, LATEX

## (undated) DEVICE — INTRODUCER SYSTEM, PRELUDE SNAP, SPLITTABLE, HEMOSTATIC, 6FR

## (undated) DEVICE — TUBING, MORCELLATOR PUMP, DISPOSABLE

## (undated) DEVICE — BAG, DRAINAGE, CONTINUOUS IRRIGATION, 4L

## (undated) DEVICE — CATHETER, LASER URETERAL, 7.1FR, 40CM

## (undated) DEVICE — UROVAC BLADDER EVACUATOR

## (undated) DEVICE — IRRIGATION SET, CYSTOSCOPY, TURP, Y, CONTINUOUS, 81 IN